# Patient Record
Sex: FEMALE | Race: OTHER | Employment: UNEMPLOYED | URBAN - METROPOLITAN AREA
[De-identification: names, ages, dates, MRNs, and addresses within clinical notes are randomized per-mention and may not be internally consistent; named-entity substitution may affect disease eponyms.]

---

## 2017-12-28 ENCOUNTER — HOSPITAL ENCOUNTER (OUTPATIENT)
Age: 27
Discharge: HOME | End: 2017-12-28
Payer: MEDICARE

## 2017-12-28 LAB
EKG ATRIAL RATE: 66 BPM
EKG P AXIS: 27 DEGREES
EKG P-R INTERVAL: 146 MS
EKG Q-T INTERVAL: 418 MS
EKG QRS DURATION: 74 MS
EKG QTC CALCULATION (BAZETT): 438 MS
EKG R AXIS: 88 DEGREES
EKG T AXIS: 54 DEGREES
EKG VENTRICULAR RATE: 66 BPM

## 2017-12-28 PROCEDURE — 93005 ELECTROCARDIOGRAM TRACING: CPT

## 2017-12-29 ENCOUNTER — HOSPITAL ENCOUNTER (OUTPATIENT)
Age: 27
Discharge: HOME | End: 2017-12-29
Payer: MEDICARE

## 2017-12-29 LAB
ALBUMIN SERPL-MCNC: 3.7 G/DL (ref 3.5–5.2)
ALBUMIN/GLOBULIN RATIO: ABNORMAL (ref 1–2.5)
ALP BLD-CCNC: 54 U/L (ref 35–104)
ALT SERPL-CCNC: 14 U/L (ref 5–33)
ANION GAP SERPL CALCULATED.3IONS-SCNC: 11 MMOL/L (ref 9–17)
AST SERPL-CCNC: 15 U/L
BILIRUB SERPL-MCNC: 0.46 MG/DL (ref 0.3–1.2)
BUN BLDV-MCNC: 6 MG/DL (ref 6–20)
BUN/CREAT BLD: ABNORMAL (ref 9–20)
CALCIUM SERPL-MCNC: 9 MG/DL (ref 8.6–10.4)
CHLORIDE BLD-SCNC: 103 MMOL/L (ref 98–107)
CHOLESTEROL/HDL RATIO: 3
CHOLESTEROL: 122 MG/DL
CO2: 26 MMOL/L (ref 20–31)
CREAT SERPL-MCNC: 0.69 MG/DL (ref 0.5–0.9)
GFR AFRICAN AMERICAN: >60 ML/MIN
GFR NON-AFRICAN AMERICAN: >60 ML/MIN
GFR SERPL CREATININE-BSD FRML MDRD: ABNORMAL ML/MIN/{1.73_M2}
GFR SERPL CREATININE-BSD FRML MDRD: ABNORMAL ML/MIN/{1.73_M2}
GLUCOSE BLD-MCNC: 100 MG/DL (ref 70–99)
HDLC SERPL-MCNC: 41 MG/DL
LDL CHOLESTEROL: 67 MG/DL (ref 0–130)
POTASSIUM SERPL-SCNC: 4 MMOL/L (ref 3.7–5.3)
SODIUM BLD-SCNC: 140 MMOL/L (ref 135–144)
TOTAL PROTEIN: 6.9 G/DL (ref 6.4–8.3)
TRIGL SERPL-MCNC: 70 MG/DL
VLDLC SERPL CALC-MCNC: NORMAL MG/DL (ref 1–30)

## 2017-12-29 PROCEDURE — 80061 LIPID PANEL: CPT

## 2017-12-29 PROCEDURE — 83036 HEMOGLOBIN GLYCOSYLATED A1C: CPT

## 2017-12-29 PROCEDURE — 80053 COMPREHEN METABOLIC PANEL: CPT

## 2017-12-29 PROCEDURE — 36415 COLL VENOUS BLD VENIPUNCTURE: CPT

## 2017-12-31 LAB
ESTIMATED AVERAGE GLUCOSE: 103 MG/DL
HBA1C MFR BLD: 5.2 % (ref 4–6)

## 2022-02-03 ENCOUNTER — HOSPITAL ENCOUNTER (EMERGENCY)
Age: 32
Discharge: HOME | End: 2022-02-03
Attending: SPECIALIST
Payer: COMMERCIAL

## 2022-02-03 ENCOUNTER — APPOINTMENT (OUTPATIENT)
Dept: GENERAL RADIOLOGY | Age: 32
End: 2022-02-03
Payer: COMMERCIAL

## 2022-02-03 VITALS
SYSTOLIC BLOOD PRESSURE: 100 MMHG | BODY MASS INDEX: 21.34 KG/M2 | HEIGHT: 64 IN | TEMPERATURE: 98.4 F | OXYGEN SATURATION: 100 % | RESPIRATION RATE: 18 BRPM | HEART RATE: 82 BPM | DIASTOLIC BLOOD PRESSURE: 64 MMHG | WEIGHT: 125 LBS

## 2022-02-03 DIAGNOSIS — R07.9 CHEST PAIN, UNSPECIFIED TYPE: Primary | ICD-10-CM

## 2022-02-03 DIAGNOSIS — K11.21 ACUTE PAROTITIS: ICD-10-CM

## 2022-02-03 LAB
ABSOLUTE EOS #: 0.1 K/UL (ref 0–0.4)
ABSOLUTE IMMATURE GRANULOCYTE: ABNORMAL K/UL (ref 0–0.3)
ABSOLUTE LYMPH #: 1.8 K/UL (ref 1–4.8)
ABSOLUTE MONO #: 0.7 K/UL (ref 0.1–1.2)
ANION GAP SERPL CALCULATED.3IONS-SCNC: 9 MMOL/L (ref 9–17)
BASOPHILS # BLD: 0 % (ref 0–2)
BASOPHILS ABSOLUTE: 0 K/UL (ref 0–0.2)
BUN BLDV-MCNC: 11 MG/DL (ref 6–20)
BUN/CREAT BLD: ABNORMAL (ref 9–20)
CALCIUM SERPL-MCNC: 9.2 MG/DL (ref 8.6–10.4)
CHLORIDE BLD-SCNC: 104 MMOL/L (ref 98–107)
CO2: 23 MMOL/L (ref 20–31)
CREAT SERPL-MCNC: 0.62 MG/DL (ref 0.5–0.9)
DIFFERENTIAL TYPE: ABNORMAL
EOSINOPHILS RELATIVE PERCENT: 1 % (ref 1–4)
GFR AFRICAN AMERICAN: >60 ML/MIN
GFR NON-AFRICAN AMERICAN: >60 ML/MIN
GFR SERPL CREATININE-BSD FRML MDRD: ABNORMAL ML/MIN/{1.73_M2}
GFR SERPL CREATININE-BSD FRML MDRD: ABNORMAL ML/MIN/{1.73_M2}
GLUCOSE BLD-MCNC: 103 MG/DL (ref 70–99)
HCT VFR BLD CALC: 40.3 % (ref 36–46)
HEMOGLOBIN: 13.4 G/DL (ref 12–16)
IMMATURE GRANULOCYTES: ABNORMAL %
LYMPHOCYTES # BLD: 20 % (ref 24–44)
MCH RBC QN AUTO: 31 PG (ref 26–34)
MCHC RBC AUTO-ENTMCNC: 33.3 G/DL (ref 31–37)
MCV RBC AUTO: 93 FL (ref 80–100)
MONOCYTES # BLD: 8 % (ref 2–11)
NRBC AUTOMATED: ABNORMAL PER 100 WBC
PDW BLD-RTO: 12.7 % (ref 12.5–15.4)
PLATELET # BLD: 258 K/UL (ref 140–450)
PLATELET ESTIMATE: ABNORMAL
PMV BLD AUTO: 8.4 FL (ref 6–12)
POTASSIUM SERPL-SCNC: 3.8 MMOL/L (ref 3.7–5.3)
RBC # BLD: 4.33 M/UL (ref 4–5.2)
RBC # BLD: ABNORMAL 10*6/UL
SEG NEUTROPHILS: 71 % (ref 36–66)
SEGMENTED NEUTROPHILS ABSOLUTE COUNT: 6.4 K/UL (ref 1.8–7.7)
SODIUM BLD-SCNC: 136 MMOL/L (ref 135–144)
TROPONIN INTERP: NORMAL
TROPONIN INTERP: NORMAL
TROPONIN T: NORMAL NG/ML
TROPONIN T: NORMAL NG/ML
TROPONIN, HIGH SENSITIVITY: <6 NG/L (ref 0–14)
TROPONIN, HIGH SENSITIVITY: <6 NG/L (ref 0–14)
WBC # BLD: 9 K/UL (ref 3.5–11)
WBC # BLD: ABNORMAL 10*3/UL

## 2022-02-03 PROCEDURE — 93005 ELECTROCARDIOGRAM TRACING: CPT | Performed by: SPECIALIST

## 2022-02-03 PROCEDURE — 71045 X-RAY EXAM CHEST 1 VIEW: CPT

## 2022-02-03 PROCEDURE — 80048 BASIC METABOLIC PNL TOTAL CA: CPT

## 2022-02-03 PROCEDURE — 36415 COLL VENOUS BLD VENIPUNCTURE: CPT

## 2022-02-03 PROCEDURE — 84484 ASSAY OF TROPONIN QUANT: CPT

## 2022-02-03 PROCEDURE — 99285 EMERGENCY DEPT VISIT HI MDM: CPT

## 2022-02-03 PROCEDURE — 6370000000 HC RX 637 (ALT 250 FOR IP): Performed by: SPECIALIST

## 2022-02-03 PROCEDURE — 85025 COMPLETE CBC W/AUTO DIFF WBC: CPT

## 2022-02-03 RX ORDER — DOXYCYCLINE HYCLATE 100 MG
100 TABLET ORAL ONCE
Status: COMPLETED | OUTPATIENT
Start: 2022-02-03 | End: 2022-02-03

## 2022-02-03 RX ORDER — DOXYCYCLINE HYCLATE 100 MG/1
100 CAPSULE ORAL 2 TIMES DAILY
Qty: 20 CAPSULE | Refills: 0 | Status: SHIPPED | OUTPATIENT
Start: 2022-02-03 | End: 2022-02-13

## 2022-02-03 RX ORDER — ASPIRIN 81 MG/1
324 TABLET, CHEWABLE ORAL ONCE
Status: COMPLETED | OUTPATIENT
Start: 2022-02-03 | End: 2022-02-03

## 2022-02-03 RX ADMIN — DOXYCYCLINE HYCLATE 100 MG: 100 TABLET, COATED ORAL at 04:02

## 2022-02-03 RX ADMIN — ASPIRIN 81 MG 324 MG: 81 TABLET ORAL at 03:12

## 2022-02-03 ASSESSMENT — PAIN DESCRIPTION - LOCATION: LOCATION: FACE

## 2022-02-03 ASSESSMENT — PAIN DESCRIPTION - ORIENTATION: ORIENTATION: LEFT

## 2022-02-03 ASSESSMENT — PAIN SCALES - GENERAL: PAINLEVEL_OUTOF10: 7

## 2022-02-03 ASSESSMENT — PAIN DESCRIPTION - PAIN TYPE: TYPE: ACUTE PAIN

## 2022-02-03 NOTE — ED NOTES
Pt to ER by self, ambulated to room, steady gait. Pt reports pressure to back that started about 24 hours ago, that is now radiating to chest. Pt denies pain, reports \"just pressure. \" Pt denies n/v/SOB. Pt denies cardiac history. Pt also reports a pimple to left side of chin, that she attempted to pop yesterday but could not evacuate any fluid. Pt does report h/o cystic ance. Pt reports area has \"tripled in size\" and is now causing pain, rates 7/10, reports taking  mg at 2100. Pt also reports that COVID symptoms started 1/17, reports she has taken ten COVID tests, all of which were negative, except for the test she took today which \"had a faint positive line. \" Pt reports no COVID vaccines.  Pt very talkative, cannot seems to focus, but remains calm, cooperative, respers even non labored, skin warm pink, no distress, here for eval.      Nohemy Fontana RN  02/03/22 Dave Mota, RN  02/03/22 1367

## 2022-02-03 NOTE — ED NOTES
Pt reports the need to void before triage is started, pt instructed to collect clean catch urine.       Hiram Wilcox RN  02/03/22 5824

## 2022-02-03 NOTE — ED PROVIDER NOTES
[facility] ENCOUNTER      Pt Name: Viola Mistry  MRN: [id number]  Jimmy 1990  Date of evaluation: 2/3/22      CHIEF COMPLAINT       Chief Complaint   Patient presents with    Chest Pain     tightness, started 24 hours ago     Facial Swelling     left side of face, pt reports a pimple that she attmepted to squeeze         HISTORY OF PRESENT ILLNESS    Elsa Jensen is a 27 y.o. female who presents to the emergency department for evaluation of substernal chest pressure and swelling on the left side of the face. Patient started having chest pressure about 36 hours ago and it has been constant pressure 8-9 out of 10 in intensity intermittently getting worse. She had associated shortness of breath but denies any nausea, vomiting, lightheadedness, dizziness, palpitations or diaphoresis. She also denies any swelling in the legs. No history of cough, fever or chills. She started having pain, redness and swelling on the left side of the face yesterday afternoon which has been increased in size tonight and that she comes to the emergency department. She has history of cystic acne and it has gotten infected in the past requiring oral antibiotics. Patient is concerned about infected cystic acne or abscess. She denies any sore throat, shortness of breath, fever or chills. She grades left facial pain is 7 out of 10 in intensity. There are no exacerbating or relieving factors and patient has taken aspirin with transient relief. REVIEW OF SYSTEMS       Review of Systems    All systems reviewed and negative unless noted in HPI. The patient denies fever or constitutional symptoms. Denies vision change. Denies any sore throat or rhinorrhea. Denies any neck pain or stiffness. Presents with chest pressure and shortness of breath. No nausea,  vomiting or diarrhea. Denies any dysuria. Denies urinary frequency or hematuria.     Denies musculoskeletal injury or pain. Denies any weakness, numbness or focal neurologic deficit. Complains of pain, redness and swelling in the left side of the face in the parotid region  No recent psychiatric issues. No easy bruising or bleeding. Denies any polyuria, polydypsia or history of immunocompromise. PAST MEDICAL HISTORY    has no past medical history on file. SURGICAL HISTORY      has no past surgical history on file. CURRENT MEDICATIONS       Previous Medications    No medications on file       ALLERGIES     is allergic to penicillins. FAMILY HISTORY     has no family status information on file. family history is not on file. SOCIAL HISTORY      reports that she has never smoked. She has never used smokeless tobacco. She reports previous alcohol use. She reports that she does not use drugs. PHYSICAL EXAM     INITIAL VITALS:  height is 5' 4\" (1.626 m) and weight is 56.7 kg (125 lb). Her oral temperature is 98.4 °F (36.9 °C). Her blood pressure is 100/64 and her pulse is 82. Her respiration is 18 and oxygen saturation is 100%. Physical Exam  Vitals and nursing note reviewed. Constitutional:       Appearance: She is well-developed. HENT:      Head: Normocephalic and atraumatic. Salivary Glands: Left salivary gland is diffusely enlarged and tender. Comments: Patient has erythema, edema and tenderness in the left parotid gland region. There is no fluctuance or abscess. Nose: Nose normal.      Mouth/Throat:      Dentition: No dental tenderness or dental abscesses. Pharynx: Oropharynx is clear. Eyes:      Extraocular Movements: Extraocular movements intact. Pupils: Pupils are equal, round, and reactive to light. Cardiovascular:      Rate and Rhythm: Normal rate and regular rhythm. Heart sounds: Normal heart sounds. No murmur heard. Pulmonary:      Effort: Pulmonary effort is normal. No respiratory distress. Breath sounds: Normal breath sounds. Abdominal:      General: Bowel sounds are normal. There is no distension. Palpations: Abdomen is soft. Tenderness: There is no abdominal tenderness. Musculoskeletal:      Cervical back: Normal range of motion and neck supple. Skin:     General: Skin is warm and dry. Neurological:      General: No focal deficit present. Mental Status: She is alert and oriented to person, place, and time. DIFFERENTIAL DIAGNOSIS/ MDM:     Bronchitis, Pneumonia, ACS, PE, Musculoskeletal pain, pneumothorax, thoracic aortic dissection, nonspecific chest pain, gastrointestinal in origin  Left facial cellulitis, acute parotitis, parotid gland sialolithiasis. HEART SCORE    Variable       Score   History  []Highly Suspicious      2     []Moderately Suspicious     1     [x]Slightly Suspicious      0     ECG   []Significant ST-depression     2     []Nonspecific Repolarization     1     [x]Normal       0     Age   []>72years old      3    []45-75 years old      1     [x]<39years old      0     Risk Factors  []>3 risk factors or history of atherosclerotic disease 2     [x]1 or 2 risk factors      1     []No risk factors      0     Troponin  []>3x normal limit      2     []1-3x normal limit      1     [x]< normal limit      0   Risk Factors: DM, current or recent (<one month) smoker, HTN, hyperlipidemia, family history of CAD or obesity     Total Score = 1    Score 0-3: 2.5% Major Acute Coronary Event over the next 6 weeks -> D/C home  Score 4-6: 20.3% MACE -> Admit for Observation  Score 7-10: 72.7% MACE ->Early Invasive Strategies  Chest Pain in the Emergency Room: A Multicenter Validation of the [address]. Ian BE, Luis AJ, Eric HUMZA, David TP, [employer], Mast EG, Sim SH, [facility]. Crit Pathw Cardiol. 2010 Sep; 9(3): 164-169.      Risk Stratification for Acute Coronary Syndrome   Family history of coronary artery disease - Yes  History of diabetes - No  History of hypertension - No  History of hyperlipidemia  - No  History of smoking - No  Cocaine use - No  Known coronary artery disease - No  (Patient is classified as low risk if he/she has one or less risk factors excluding diabetes and known coronary artery disease)    Risk Stratification for Thoracic Aortic Dissection (TAD)  Family history of TAD - No  History of connective tissue disorder (i.e. Marfans Syndrome, Agusto-Danlos Syndrome) - No  Pardos Syndrome - No  History of hypertension - No  History of aortic valve disease - No  Pregnancy - No    Risk Stratification for Pulmonary Embolism (PE)  Previous PE - No  Malignancy - No  Obesity - No  Trauma - No  Di filter - No  Pregnancy/postpartum - No  Smoking - No  Prior DVT - No  Immobilization (i.e. leg cast, travel) - No  Surgery within the last 60 days - No  Coagulation disorder - No  Estrogen medicine - No    DIAGNOSTIC RESULTS     EKG: All EKG's are interpreted by the Emergency Department Physician who either signs or Co-signs this chart in the absence of a cardiologist.    EKG Interpretation #1    Interpreted by emergency department physician    Rhythm: normal sinus   Rate: normal  Axis: normal  Conduction: normal  ST Segments: no acute change  T Waves: no acute change  Q Waves: no acute change    Clinical Impression: no acute change, but this is a nonspecific EKG. EKG Interpretation #2    Interpreted by me    Rhythm: normal sinus   Rate: normal  Axis: normal  Ectopy: none  Conduction: normal  ST Segments: no acute change  T Waves: no acute change  Q Waves: none    Clinical Impression: no acute changes and normal EKG    RADIOLOGY:   Interpretation per the Radiologist below, if available at the time of this note:    XR CHEST PORTABLE    Result Date: 2/3/2022  EXAMINATION: ONE XRAY VIEW OF THE CHEST 2/3/2022 3:02 am COMPARISON: None.  HISTORY: ORDERING SYSTEM PROVIDED HISTORY: CHEST PAIN TECHNOLOGIST PROVIDED HISTORY: CHEST PAIN Reason for Exam: chest pain FINDINGS: Heart size is normal.  Positioning is apical lordotic which does somewhat limit assessment. No pneumothorax. No effusion. No distinct focal consolidation. Minimal interstitial prominence. Mild interstitial prominence which is probably incidental.  Early changes of atypical or viral infection less likely.   PA and lateral views would be useful for further assessment, if symptoms persist.       LABS:  Results for orders placed or performed during the hospital encounter of 02/03/22   CBC Auto Differential   Result Value Ref Range    WBC 9.0 3.5 - 11.0 k/uL    RBC 4.33 4.0 - 5.2 m/uL    Hemoglobin 13.4 12.0 - 16.0 g/dL    Hematocrit 40.3 36 - 46 %    MCV 93.0 80 - 100 fL    MCH 31.0 26 - 34 pg    MCHC 33.3 31 - 37 g/dL    RDW 12.7 12.5 - 15.4 %    Platelets 805 841 - 929 k/uL    MPV 8.4 6.0 - 12.0 fL    NRBC Automated NOT REPORTED per 100 WBC    Differential Type NOT REPORTED     Seg Neutrophils 71 (H) 36 - 66 %    Lymphocytes 20 (L) 24 - 44 %    Monocytes 8 2 - 11 %    Eosinophils % 1 1 - 4 %    Basophils 0 0 - 2 %    Immature Granulocytes NOT REPORTED 0 %    Segs Absolute 6.40 1.8 - 7.7 k/uL    Absolute Lymph # 1.80 1.0 - 4.8 k/uL    Absolute Mono # 0.70 0.1 - 1.2 k/uL    Absolute Eos # 0.10 0.0 - 0.4 k/uL    Basophils Absolute 0.00 0.0 - 0.2 k/uL    Absolute Immature Granulocyte NOT REPORTED 0.00 - 0.30 k/uL    WBC Morphology NOT REPORTED     RBC Morphology NOT REPORTED     Platelet Estimate NOT REPORTED    Basic Metabolic Panel w/ Reflex to MG   Result Value Ref Range    Glucose 103 (H) 70 - 99 mg/dL    BUN 11 6 - 20 mg/dL    CREATININE 0.62 0.50 - 0.90 mg/dL    Bun/Cre Ratio NOT REPORTED 9 - 20    Calcium 9.2 8.6 - 10.4 mg/dL    Sodium 136 135 - 144 mmol/L    Potassium 3.8 3.7 - 5.3 mmol/L    Chloride 104 98 - 107 mmol/L    CO2 23 20 - 31 mmol/L    Anion Gap 9 9 - 17 mmol/L    GFR Non-African American >60 >60 mL/min    GFR African American >60 >60 mL/min    GFR Comment          GFR Staging NOT REPORTED    Troponin   Result Value Ref Range    Troponin, High Sensitivity <6 0 - 14 ng/L    Troponin T NOT REPORTED <0.03 ng/mL    Troponin Interp NOT REPORTED    Troponin   Result Value Ref Range    Troponin, High Sensitivity <6 0 - 14 ng/L    Troponin T NOT REPORTED <0.03 ng/mL    Troponin Interp NOT REPORTED        I reviewed all the lab results, 2 sets of cardiac markers are normal    EMERGENCY DEPARTMENT COURSE:   Vitals:    Vitals:    02/03/22 0247 02/03/22 0517   BP: 120/77 100/64   Pulse: 88 82   Resp: 20 18   Temp: 98.4 °F (36.9 °C)    TempSrc: Oral    SpO2: 100% 100%   Weight: 56.7 kg (125 lb)    Height: 5' 4\" (1.626 m)      -------------------------  BP: 100/64, Temp: 98.4 °F (36.9 °C), Pulse: 82, Resp: 18    Orders Placed This Encounter   Medications    aspirin chewable tablet 324 mg    doxycycline hyclate (VIBRA-TABS) tablet 100 mg     Order Specific Question:   Antimicrobial Indications     Answer:   Skin and Soft Tissue Infection    doxycycline hyclate (VIBRAMYCIN) 100 MG capsule     Sig: Take 1 capsule by mouth 2 times daily for 10 days     Dispense:  20 capsule     Refill:  0         During the emergency department course, patient was given aspirin 324 mg orally. She was put on the monitor which reveals normal sinus rhythm. She remained in normal sinus rhythm throughout the ED course. Her chest pain work-up including 2 sets of cardiac markers and EKG twice are unremarkable. Plan is to discharge patient on doxycycline 100 mg twice daily for 10 days. She was given 1 dose in the emergency department prior to discharge. She is advised to follow-up with her PCP for further evaluation as an outpatient including possibly stress test, return if chest pain recurs or if she develops any new symptoms. The patient presents with chest pain that is not suggesting in nature of pulmonary emnbolus, aortic dissection, cardiac ischemia, aortic dissection, or other serious etiology.  Given the extremely low risk of these diagnoses further testing and evaluation for these possibilites are not indicated at is time. The patient has been instructed to return if the symptpoms change or worsen in any way. I have reviewed the disposition diagnosis with the patient and or their family/guardian. I have answered their questions and given discharge instructions. They voiced understanding of these instructions and did not have any further questions or complaints. CONSULTS:  None    PROCEDURES:  None    FINAL IMPRESSION      1. Chest pain, unspecified type    2. Acute parotitis          DISPOSITION/PLAN       PATIENT REFERRED TO:  Vinod Read DO  [address] [phone]    Call in 2 days  For reevaluation of current symptoms    [facility]  [address] [place] [id number] [phone]    If symptoms worsen, If chest pain recurs or any new symptoms appear      DISCHARGE MEDICATIONS:  New Prescriptions    DOXYCYCLINE HYCLATE (VIBRAMYCIN) 100 MG CAPSULE    Take 1 capsule by mouth 2 times daily for 10 days       (Please note that portions of this note were completed with a voice recognition program.  Efforts were made to edit the dictations but occasionally words are mis-transcribed.)    Lynnette Jameson MD,, MD, F.A.C.E.P.   Attending Emergency Medicine Physician      Lynnette Jameson MD  02/03/22 0040

## 2022-02-04 LAB
EKG ATRIAL RATE: 76 BPM
EKG P AXIS: 4 DEGREES
EKG P-R INTERVAL: 148 MS
EKG Q-T INTERVAL: 382 MS
EKG QRS DURATION: 68 MS
EKG QTC CALCULATION (BAZETT): 429 MS
EKG R AXIS: 80 DEGREES
EKG T AXIS: 41 DEGREES
EKG VENTRICULAR RATE: 76 BPM

## 2022-02-05 LAB
EKG ATRIAL RATE: 71 BPM
EKG P AXIS: 76 DEGREES
EKG P-R INTERVAL: 154 MS
EKG Q-T INTERVAL: 402 MS
EKG QRS DURATION: 70 MS
EKG QTC CALCULATION (BAZETT): 436 MS
EKG R AXIS: 80 DEGREES
EKG T AXIS: 64 DEGREES
EKG VENTRICULAR RATE: 71 BPM

## 2022-12-24 ENCOUNTER — APPOINTMENT (OUTPATIENT)
Dept: CT IMAGING | Age: 32
End: 2022-12-24
Payer: COMMERCIAL

## 2022-12-24 ENCOUNTER — HOSPITAL ENCOUNTER (EMERGENCY)
Age: 32
Discharge: HOME | End: 2022-12-24
Attending: EMERGENCY MEDICINE
Payer: COMMERCIAL

## 2022-12-24 VITALS
HEIGHT: 64 IN | TEMPERATURE: 97.7 F | HEART RATE: 95 BPM | OXYGEN SATURATION: 100 % | WEIGHT: 125 LBS | SYSTOLIC BLOOD PRESSURE: 108 MMHG | DIASTOLIC BLOOD PRESSURE: 77 MMHG | RESPIRATION RATE: 16 BRPM | BODY MASS INDEX: 21.34 KG/M2

## 2022-12-24 DIAGNOSIS — R82.71 ASYMPTOMATIC BACTERIURIA DURING PREGNANCY: ICD-10-CM

## 2022-12-24 DIAGNOSIS — R07.9 RIGHT-SIDED CHEST PAIN: Primary | ICD-10-CM

## 2022-12-24 DIAGNOSIS — O99.891 ASYMPTOMATIC BACTERIURIA DURING PREGNANCY: ICD-10-CM

## 2022-12-24 LAB
ABSOLUTE EOS #: 0.1 K/UL (ref 0–0.4)
ABSOLUTE LYMPH #: 1.8 K/UL (ref 1–4.8)
ABSOLUTE MONO #: 0.6 K/UL (ref 0.1–1.2)
ALBUMIN SERPL-MCNC: 3.3 G/DL (ref 3.5–5.2)
ALBUMIN/GLOBULIN RATIO: 1.2 (ref 1–2.5)
ALP BLD-CCNC: 59 U/L (ref 35–104)
ALT SERPL-CCNC: 13 U/L (ref 5–33)
ANION GAP SERPL CALCULATED.3IONS-SCNC: 9 MMOL/L (ref 9–17)
AST SERPL-CCNC: 12 U/L
BACTERIA: ABNORMAL
BASOPHILS # BLD: 1 % (ref 0–2)
BASOPHILS ABSOLUTE: 0.1 K/UL (ref 0–0.2)
BILIRUB SERPL-MCNC: 0.2 MG/DL (ref 0.3–1.2)
BILIRUBIN DIRECT: <0.1 MG/DL
BILIRUBIN URINE: NEGATIVE
BILIRUBIN, INDIRECT: ABNORMAL MG/DL (ref 0–1)
BUN BLDV-MCNC: 13 MG/DL (ref 6–20)
CALCIUM SERPL-MCNC: 8.6 MG/DL (ref 8.6–10.4)
CHLORIDE BLD-SCNC: 104 MMOL/L (ref 98–107)
CO2: 20 MMOL/L (ref 20–31)
COLOR: YELLOW
CREAT SERPL-MCNC: 0.57 MG/DL (ref 0.5–0.9)
D-DIMER QUANTITATIVE: 8 MG/L FEU
EOSINOPHILS RELATIVE PERCENT: 0 % (ref 1–4)
EPITHELIAL CELLS UA: ABNORMAL /HPF (ref 0–5)
GFR SERPL CREATININE-BSD FRML MDRD: >60 ML/MIN/1.73M2
GLUCOSE BLD-MCNC: 81 MG/DL (ref 70–99)
GLUCOSE URINE: NEGATIVE
HCT VFR BLD CALC: 36.2 % (ref 36–46)
HEMOGLOBIN: 12 G/DL (ref 12–16)
KETONES, URINE: NEGATIVE
LEUKOCYTE ESTERASE, URINE: ABNORMAL
LIPASE: 35 U/L (ref 13–60)
LYMPHOCYTES # BLD: 15 % (ref 24–44)
MCH RBC QN AUTO: 31.3 PG (ref 26–34)
MCHC RBC AUTO-ENTMCNC: 33 G/DL (ref 31–37)
MCV RBC AUTO: 94.9 FL (ref 80–100)
MONOCYTES # BLD: 6 % (ref 2–11)
NITRITE, URINE: NEGATIVE
OTHER OBSERVATIONS UA: ABNORMAL
PDW BLD-RTO: 12.5 % (ref 12.5–15.4)
PH UA: 5.5 (ref 5–8)
PLATELET # BLD: 220 K/UL (ref 140–450)
PMV BLD AUTO: 8.6 FL (ref 6–12)
POTASSIUM SERPL-SCNC: 3.9 MMOL/L (ref 3.7–5.3)
PROTEIN UA: NEGATIVE
RBC # BLD: 3.82 M/UL (ref 4–5.2)
RBC UA: ABNORMAL /HPF (ref 0–2)
SEG NEUTROPHILS: 78 % (ref 36–66)
SEGMENTED NEUTROPHILS ABSOLUTE COUNT: 9 K/UL (ref 1.8–7.7)
SODIUM BLD-SCNC: 133 MMOL/L (ref 135–144)
SPECIFIC GRAVITY UA: 1.01 (ref 1–1.03)
TOTAL PROTEIN: 6.1 G/DL (ref 6.4–8.3)
TROPONIN, HIGH SENSITIVITY: <6 NG/L (ref 0–14)
TURBIDITY: CLEAR
URINE HGB: NEGATIVE
UROBILINOGEN, URINE: NORMAL
WBC # BLD: 11.6 K/UL (ref 3.5–11)
WBC UA: ABNORMAL /HPF (ref 0–5)

## 2022-12-24 PROCEDURE — 2580000003 HC RX 258: Performed by: PHYSICIAN ASSISTANT

## 2022-12-24 PROCEDURE — 87086 URINE CULTURE/COLONY COUNT: CPT

## 2022-12-24 PROCEDURE — 99285 EMERGENCY DEPT VISIT HI MDM: CPT

## 2022-12-24 PROCEDURE — 6370000000 HC RX 637 (ALT 250 FOR IP): Performed by: PHYSICIAN ASSISTANT

## 2022-12-24 PROCEDURE — 86403 PARTICLE AGGLUT ANTBDY SCRN: CPT

## 2022-12-24 PROCEDURE — 84484 ASSAY OF TROPONIN QUANT: CPT

## 2022-12-24 PROCEDURE — 81001 URINALYSIS AUTO W/SCOPE: CPT

## 2022-12-24 PROCEDURE — 85379 FIBRIN DEGRADATION QUANT: CPT

## 2022-12-24 PROCEDURE — 85025 COMPLETE CBC W/AUTO DIFF WBC: CPT

## 2022-12-24 PROCEDURE — 93005 ELECTROCARDIOGRAM TRACING: CPT | Performed by: PHYSICIAN ASSISTANT

## 2022-12-24 PROCEDURE — 80076 HEPATIC FUNCTION PANEL: CPT

## 2022-12-24 PROCEDURE — 80048 BASIC METABOLIC PNL TOTAL CA: CPT

## 2022-12-24 PROCEDURE — 6360000004 HC RX CONTRAST MEDICATION: Performed by: PHYSICIAN ASSISTANT

## 2022-12-24 PROCEDURE — 71260 CT THORAX DX C+: CPT | Performed by: PHYSICIAN ASSISTANT

## 2022-12-24 PROCEDURE — 83690 ASSAY OF LIPASE: CPT

## 2022-12-24 PROCEDURE — 36415 COLL VENOUS BLD VENIPUNCTURE: CPT

## 2022-12-24 RX ORDER — SODIUM CHLORIDE 0.9 % (FLUSH) 0.9 %
10 SYRINGE (ML) INJECTION ONCE
Status: COMPLETED | OUTPATIENT
Start: 2022-12-24 | End: 2022-12-24

## 2022-12-24 RX ORDER — CEPHALEXIN 250 MG/1
500 CAPSULE ORAL ONCE
Status: COMPLETED | OUTPATIENT
Start: 2022-12-24 | End: 2022-12-24

## 2022-12-24 RX ORDER — CLOTRIMAZOLE 1 %
1 CREAM WITH APPLICATOR VAGINAL NIGHTLY
Qty: 7 EACH | Refills: 0 | Status: SHIPPED | OUTPATIENT
Start: 2022-12-24 | End: 2022-12-31

## 2022-12-24 RX ORDER — 0.9 % SODIUM CHLORIDE 0.9 %
80 INTRAVENOUS SOLUTION INTRAVENOUS ONCE
Status: DISCONTINUED | OUTPATIENT
Start: 2022-12-24 | End: 2022-12-24 | Stop reason: HOSPADM

## 2022-12-24 RX ORDER — CEPHALEXIN 500 MG/1
500 CAPSULE ORAL 3 TIMES DAILY
Qty: 15 CAPSULE | Refills: 0 | Status: SHIPPED | OUTPATIENT
Start: 2022-12-24 | End: 2022-12-29

## 2022-12-24 RX ADMIN — Medication 80 ML: at 19:59

## 2022-12-24 RX ADMIN — CEPHALEXIN 500 MG: 250 CAPSULE ORAL at 21:15

## 2022-12-24 RX ADMIN — IOPAMIDOL 100 ML: 755 INJECTION, SOLUTION INTRAVENOUS at 19:59

## 2022-12-24 RX ADMIN — SODIUM CHLORIDE, PRESERVATIVE FREE 10 ML: 5 INJECTION INTRAVENOUS at 19:59

## 2022-12-24 ASSESSMENT — PAIN DESCRIPTION - ORIENTATION: ORIENTATION: RIGHT;LOWER

## 2022-12-24 ASSESSMENT — PAIN DESCRIPTION - LOCATION: LOCATION: CHEST

## 2022-12-24 ASSESSMENT — PAIN DESCRIPTION - FREQUENCY: FREQUENCY: CONTINUOUS

## 2022-12-24 ASSESSMENT — PAIN DESCRIPTION - PAIN TYPE: TYPE: ACUTE PAIN

## 2022-12-24 ASSESSMENT — PAIN SCALES - GENERAL
PAINLEVEL_OUTOF10: 10
PAINLEVEL_OUTOF10: 3

## 2022-12-24 ASSESSMENT — PAIN DESCRIPTION - DESCRIPTORS: DESCRIPTORS: DISCOMFORT

## 2022-12-24 NOTE — ED PROVIDER NOTES
Attending Supervisory Note/Shared Visit   I have personally performed a face to face diagnostic evaluation on this patient. I have reviewed the mid-levels findings and agree.       (Please note that portions of this note were completed with a voice recognition program.  Efforts were made to edit the dictations but occasionally words are mis-transcribed.)    Deep Shaw MD  Attending Emergency Physician        Deep Shaw MD  12/24/22 0812

## 2022-12-25 NOTE — DISCHARGE INSTRUCTIONS
answers, a clinician may call you back to offer help and instructions. Tell us how we did during your visit at [email or web address]   and let us know about your experience

## 2022-12-25 NOTE — ED PROVIDER NOTES
[address] Emergency Department  [address] [facility] [address] [facility] [address]  Phone: [phone]  Fax: [phone]        Pt Name: Lanre Nguyen  MRN: [id number]  [place] 1990  Date of evaluation: 12/24/22    [address] [place] [address]       Chief Complaint   Patient presents with    Chest Pain     Had pleurisy 5 months ago. Right side began last night. Feels different       HISTORY OF PRESENT ILLNESS (Location/Symptom, Timing/Onset, Context/Setting, Quality, Duration, Modifying Factors, Severity)      Elsa Jacob is a 27 y.o. female with pertinent PMH of \"pleurisy\" who presents to the ED via private auto with chest pain. Patient reports that she is approximately 16 weeks pregnant; her LMP was in August. Since last night she has been experiencing a sense of pressure and sharp pain to the right side of her chest that is worse with taking a deep breath. Denies taking any medication for symptoms. Denies history of similar occurrences. She does report that her sister has a history of blood clots and has to do Lovenox in pregnancy. Also another family member with history of blood clots. She has never been worked up for this. No history herself. Patient does feel that she is short of breath and that she cannot take a deep breath. She feels like the chest pain is worse when she lays down and a little better when she is sitting upright. Denies noticing any other exacerbating relieving factors. She has not received any prenatal care as she just found out a little over a month ago that she was pregnant and notes that she feels the baby move so just assumes everything is okay. She is on a multivitamin but no prenatals. Mentions that she has been dealing with vaginal yeast but no other vaginal complaints. Denies recent trauma, surgery, or extended travel. Denies hemoptysis, calf pain, or leg swelling.  Denies fever, chills, URI, N/V/D, abdominal pain, vaginal discharge, vaginal bleeding, back pain, dysuria, hematuria, lightheadedness, dizziness, syncope. PAST MEDICAL / SURGICAL / SOCIAL / FAMILY HISTORY     PMH:  has no past medical history on file. Surgical History:  has no past surgical history on file. Social History:  reports that she has never smoked. She has never used smokeless tobacco. She reports that she does not currently use alcohol. She reports that she does not use drugs. Family History: has no family status information on file. family history is not on file. Psychiatric History: None    Allergies: Penicillins    Home Medications:   Prior to Admission medications    Medication Sig Start Date End Date Taking? Authorizing Provider   clotrimazole (LOTRIMIN) 1 % vaginal cream Place 1 applicator vaginally at bedtime for 7 days 12/24/22 12/31/22 Yes Bessy Cristina PA-C       REVIEW OF SYSTEMS  (2-9 systems for level 4, 10 ormore for level 5)      Review of Systems    Constitutional: See HPI. Eyes: Denies vision changes. HENT: Denies sore throat or neck pain. Respiratory: See HPI. Cardiovascular: See HPI. GI: See HPI. : See HPI. Musculoskeletal: Denies recent trauma. Skin: Denies new rashes or wounds. Neurologic:  Denies new numbness or weakness. Psychiatric: Denies sleep disturbances. All other systems negative except as marked. PHYSICAL EXAM  (up to 7 for level 4, 8 or more for level 5)      INITIAL VITALS:  height is 5' 4\" (1.626 m) and weight is 56.7 kg (125 lb). Her oral temperature is 97.7 °F (36.5 °C). Her blood pressure is 108/77 and her pulse is 95. Her respiration is 16 and oxygen saturation is 100%. Vital signs reviewed. Physical Exam    General:  Alert, cooperative, well-groomed, well-nourished, appears stated age, and is in no acute distress. Head:  Normocephalic, atraumatic, and without obvious abnormality. Eyes:  Sclerae/conjunctivae clear without injection, pallor, or icterus. Corneas clear without opacities. EOM's intact. ENT: Ears and nose are all without obvious masses lesion or deformity. No oropharynx examination performed due to aerosolization risk during COVID-19 pandemic. Neck: Supple and symmetrical. Trachea midline. No adenopathy. No jugular venous distention. Lungs:   No respiratory distress. CTA bilaterally. No wheezes, rhonchi, or rales. No chest wall tenderness to palpation. Heart:  Regular rate. Regular rhythm. No murmurs, rubs, or gallops. Abdomen:   Normoactive bowel sounds. Gravid abdomen. Uterus above umbulicus. Soft, nontender, nondistended without guarding or rebound. No palpable masses. No CVA tenderness. Extremities: Warm and dry without erythema or edema. No venous stasis changes. Distal pulses 2+. Skin: Soft, good turgor, and well-hydrated. No obvious rashes or lesions. Neurologic: GCS is 15 and no focal deficits are appreciated. Normal gait. Grossly normal motor and sensation. Speech clear. Psychiatric: Normal mood and affect. Normal behavior. Coherent thought process. DIFFERENTIAL DIAGNOSIS / MDM     DDx: ACS/NSTEMI/STEMI/angina, arrhythmia, trauma, aortic dissection, PE, pneumonia, pneumothorax, esophageal rupture, tamponade, Cocaine use, pericarditis, GERD, costochondritis, endocarditis, anxiety    Patient is a 27 y.o. female who presents to the ED today with symptoms of chest pain as described above. Vital signs are stable. Physical exam demonstrates a well-appearing nontoxic female in no acute distress. She is mildly anxious. Lungs are clear to auscultation. Heart rate and rhythm are regular. Abdomen is soft and nontender. Gravid abdomen noted. Uterus is palpable just above the umbilicus. As the patient has never had an ultrasound and thinks she is about \"16 weeks\" I performed a bedside ultrasound which shows good fetal movement cardiac motion with fetal heart rate at 142. Patient was saying that her LMP was in August but even so that would still make her 20 weeks.   She has absolutely no tenderness palpation of the abdomen and we do not have ultrasound available here at this time. I do not feel that she needs a formal ultrasound from an ER setting and can obtain one outpatient. In regards to the chest pain, with the patient's family history of blood clots, particular during pregnancy, feel that she requires a work-up to rule out PE. Ideally her D-dimer will be negative but if not we will obtain a CT scan of the chest as benefits outweigh the risks at this time. HEART Risk Score:      Criteria Score Patient #   History High Suspicion 2 0    Mod Suspicion 1     Slightly Suspicion 0    EKG Significant ST Dep. 2 0    Nonspecific 1     Normal 0    Age ? 69 y/o 2 27 y.o.  0    > 40 y/o & < 71 y/o 1     ? 40 y/o 0    Risk Factors* ? 3 2 0    1-2 1     0 0    Troponin ? 3x NL 2 0    > 1 & < 3x NL 1     Normal 0    Score 0-3 Low 0    4-6 Mod     ?7 High    * Risk Factors include: Diabetes, Tobacco use, Hypertension, Hyperlipidemia, Obesity, Atherosclerotic Disease (Prior MI, PCI/CABG, CVA/TIA, PAD), Family History of CAD (before age 72)     PERC Criteria   Criteria Response   Age ? 50 No   Heart Rate ? 100 No   Pulse Oximetry ? 95 No   Previous history of DVT/PE No (fam yes)   Trauma/surgery ? 4 weeks No   Hemoptysis No   Exogenous estrogen use Yes   Unilateral leg swelling No   * Pulmonary embolism may be ruled out with a pretest probability of <15% and completely negative PERC Criteria. The likelihood of pulmonary embolism in this population is 1.8%.     PLAN (LABS / IMAGING / EKG):  Orders Placed This Encounter   Procedures    Culture, Urine    CT CHEST PULMONARY EMBOLISM W CONTRAST    CBC with Auto Differential    Basic Metabolic Panel w/ Reflex to MG    Hepatic Function Panel    Lipase    Troponin    D-Dimer, Quantitative    Urinalysis with Reflex to Culture    Microscopic Urinalysis    EKG 12 Lead    Insert peripheral IV       MEDICATIONS ORDERED:  Orders Placed This Encounter Medications    DISCONTD: 0.9 % sodium chloride bolus    sodium chloride flush 0.9 % injection 10 mL    iopamidol (ISOVUE-370) 76 % injection 100 mL    cephALEXin (KEFLEX) 500 MG capsule     Sig: Take 1 capsule by mouth 3 times daily for 5 days     Dispense:  15 capsule     Refill:  0    cephALEXin (KEFLEX) capsule 500 mg     Order Specific Question:   Antimicrobial Indications     Answer:   Urinary Tract Infection    clotrimazole (LOTRIMIN) 1 % vaginal cream     Sig: Place 1 applicator vaginally at bedtime for 7 days     Dispense:  7 each     Refill:  0       Controlled Substances Monitoring:     DIAGNOSTIC RESULTS     EKG: All EKG's are interpreted by the Emergency Department Physician who either signs or Co-signs this chart in the [address] a cardiologist.    See ED attending note    RADIOLOGY: All images are read by the radiologist and their interpretations are reviewed. CT CHEST PULMONARY EMBOLISM W CONTRAST    Result Date: 12/24/2022  EXAMINATION: CTA OF THE CHEST 12/24/2022 7:57 pm TECHNIQUE: CTA of the chest was performed after the administration of intravenous contrast.  Multiplanar reformatted images are provided for review. MIP images are provided for review. Automated exposure control, iterative reconstruction, and/or weight based adjustment of the mA/kV was utilized to reduce the radiation dose to as low as reasonably achievable. COMPARISON: None. HISTORY: ORDERING SYSTEM PROVIDED HISTORY: RUQ/R chest pain, pregnant, recent travel, SOB TECHNOLOGIST PROVIDED HISTORY: RUQ/R chest pain, pregnant, recent travel, SOB Decision Support Exception - unselect if not a suspected or confirmed emergency medical condition->Emergency Medical Condition (MA) Reason for Exam: RUQ/R chest pain, pregnant, recent travel, SOB FINDINGS: Pulmonary Arteries: Pulmonary arteries are adequately opacified for evaluation. No evidence of intraluminal filling defect to suggest pulmonary embolism.   Main pulmonary artery is normal Result Value Ref Range    Glucose 81 70 - 99 mg/dL    BUN 13 6 - 20 mg/dL    Creatinine 0.57 0.50 - 0.90 mg/dL    Est, Glom Filt Rate >60 >60 mL/min/1.73m2    Calcium 8.6 8.6 - 10.4 mg/dL    Sodium 133 (L) 135 - 144 mmol/L    Potassium 3.9 3.7 - 5.3 mmol/L    Chloride 104 98 - 107 mmol/L    CO2 20 20 - 31 mmol/L    Anion Gap 9 9 - 17 mmol/L   Hepatic Function Panel   Result Value Ref Range    Albumin 3.3 (L) 3.5 - 5.2 g/dL    Alkaline Phosphatase 59 35 - 104 U/L    ALT 13 5 - 33 U/L    AST 12 <32 U/L    Total Bilirubin 0.2 (L) 0.3 - 1.2 mg/dL    Bilirubin, Direct <0.1 <0.3 mg/dL    Bilirubin, Indirect Can not be calculated 0.0 - 1.0 mg/dL    Total Protein 6.1 (L) 6.4 - 8.3 g/dL    Albumin/Globulin Ratio 1.2 1.0 - 2.5   Lipase   Result Value Ref Range    Lipase 35 13 - 60 U/L   Troponin   Result Value Ref Range    Troponin, High Sensitivity <6 0 - 14 ng/L   D-Dimer, Quantitative   Result Value Ref Range    D-Dimer, Quant 8.00 mg/L FEU   Urinalysis with Reflex to Culture    Specimen: Urine   Result Value Ref Range    Color, UA Yellow Yellow    Turbidity UA Clear Clear    Glucose, Ur NEGATIVE NEGATIVE    Bilirubin Urine NEGATIVE NEGATIVE    Ketones, Urine NEGATIVE NEGATIVE    Specific Gravity, UA 1.007 1.005 - 1.030    Urine Hgb NEGATIVE NEGATIVE    pH, UA 5.5 5.0 - 8.0    Protein, UA NEGATIVE NEGATIVE    Urobilinogen, Urine Normal Normal    Nitrite, Urine NEGATIVE NEGATIVE    Leukocyte Esterase, Urine MODERATE (A) NEGATIVE   Microscopic Urinalysis   Result Value Ref Range    WBC, UA 10 TO 20 0 - 5 /HPF    RBC, UA 2 TO 5 0 - 2 /HPF    Epithelial Cells UA 2 TO 5 0 - 5 /HPF    Bacteria, UA MODERATE (A) None    Other Observations UA Culture ordered based on defined criteria. (A) NOT REQ.    EKG 12 Lead   Result Value Ref Range    Ventricular Rate 81 BPM    Atrial Rate 81 BPM    P-R Interval 170 ms    QRS Duration 72 ms    Q-T Interval 370 ms    QTc Calculation (Bazett) 429 ms    P Axis -12 degrees    R Axis 78 degrees T Axis 38 degrees       EMERGENCY DEPARTMENT COURSE           Vitals:    Vitals:    12/24/22 1759   BP: 108/77   Pulse: 95   Resp: 16   Temp: 97.7 °F (36.5 °C)   TempSrc: Oral   SpO2: 100%   Weight: 56.7 kg (125 lb)   Height: 5' 4\" (1.626 m)     -------------------------  BP: 108/77, Temp: 97.7 °F (36.5 °C), Heart Rate: 95, Resp: 16      RE-EVALUATION:  The patient's symptomatology and physical exam are not completely consistent with esophageal rupture, myocarditis, ACS, PE, pneumothorax, cardiac tomponade, or aortic pathology. The patient's EKG demonstrates normal sinus without acute ischemia/infarction noted. Unfortunately she is pregnant and was PERC positive and her ddimer was 8 and weighing the risks and benefits, along with radiology advice, we elected to perform a CT scan of her chest to rule-out PE. The patient's chest CT was within normal limits making significant pneumothorax, pneumonia, lung abscess, pleurisy, or aortic pathology much less likely. Patient's lab work including troponin was unremarkable. Patient has been updated regarding these results and reports that the chest pain has improved. We discussed that this could GERD vs anxiety vs costochondritis or pleurisy. Asymptomatic bacturia noted as well and will treat. Patient advised that she needs to see an OB for a formal u/s and prenatal care and was given information for one. The patient and/or family and I have discussed the diagnosis and risks, and we agree with discharging home to follow-up with their primary doctor. The patient appears stable for discharge and has been instructed to return immediately for new concerning symptoms, if the symptoms worsen in any way.  We have discussed the symptoms which are most concerning (e.g., fever, changing or worsening pain, persistent vomiting, bloody stools, chest pain, shortness of breath, numbness or weakness to the arms or legs, coolness or color change to the arms or legs) that necessitate immediate return. The patient understands that at this time there is no evidence for a more malignant underlying process, but the patient also understands that early in the process of an illness or injury, an emergency department workup can be falsely reassuring. Routine discharge counseling was given, and the patient understands that worsening, changing or persistent symptoms should prompt an immediate call or follow up with their primary physician or return to the emergency department. The importance of appropriate follow up was also discussed. I have reviewed the disposition diagnosis with the patient and or their family/guardian. I have answered their questions and given discharge instructions. They voiced understanding of these instructions and did not have any further questions or complaints. This patient was seen by the attending physician and they agreed with the assessment and plan. CONSULTS:  None    PROCEDURES:  None    FINAL IMPRESSION      1. Right-sided chest pain    2. Asymptomatic bacteriuria during pregnancy          DISPOSITION / PLAN     CONDITION ON DISPOSITION:   Good / Stable for discharge.      PATIENT REFERRED TO:  Malik Paul DO  [address] [place] [address] O 21 [phone]    Call in 3 days      [address] [address] [phone]  Call in 3 days  To schedule an appointment as soon as possible    DISCHARGE MEDICATIONS:  Discharge Medication List as of 12/24/2022  9:10 PM        START taking these medications    Details   cephALEXin (KEFLEX) 500 MG capsule Take 1 capsule by mouth 3 times daily for 5 days, Disp-15 capsule, R-0Normal      clotrimazole (LOTRIMIN) 1 % vaginal cream Place 1 applicator vaginally at bedtime for 7 days, Disp-7 each, R-0Normal             Guillermo Rosen PA-C   Emergency Medicine Physician Assistant    (Please note that portions of this note were completed with a voice recognition program.  Efforts were made to edit the dictations but occasionally words aremis-transcribed.)      Danielle Ortega PA-C  12/30/22 2779

## 2022-12-26 LAB
CULTURE: ABNORMAL
EKG ATRIAL RATE: 81 BPM
EKG P AXIS: -12 DEGREES
EKG P-R INTERVAL: 170 MS
EKG Q-T INTERVAL: 370 MS
EKG QRS DURATION: 72 MS
EKG QTC CALCULATION (BAZETT): 429 MS
EKG R AXIS: 78 DEGREES
EKG T AXIS: 38 DEGREES
EKG VENTRICULAR RATE: 81 BPM
SPECIMEN DESCRIPTION: ABNORMAL

## 2023-03-17 ENCOUNTER — HOSPITAL ENCOUNTER (OUTPATIENT)
Age: 33
Discharge: HOME | End: 2023-03-18
Attending: OBSTETRICS & GYNECOLOGY | Admitting: OBSTETRICS & GYNECOLOGY
Payer: COMMERCIAL

## 2023-03-17 DIAGNOSIS — R10.11 RUQ PAIN: Primary | ICD-10-CM

## 2023-03-17 PROBLEM — Z3A.37 37 WEEKS GESTATION OF PREGNANCY: Status: ACTIVE | Noted: 2023-03-17

## 2023-03-17 RX ORDER — CYCLOBENZAPRINE HCL 10 MG
10 TABLET ORAL
Status: DISCONTINUED | OUTPATIENT
Start: 2023-03-17 | End: 2023-03-18

## 2023-03-17 RX ORDER — ONDANSETRON 2 MG/ML
4 INJECTION INTRAMUSCULAR; INTRAVENOUS EVERY 6 HOURS PRN
Status: DISCONTINUED | OUTPATIENT
Start: 2023-03-17 | End: 2023-03-18 | Stop reason: HOSPADM

## 2023-03-17 RX ORDER — ACETAMINOPHEN 500 MG
1000 TABLET ORAL EVERY 6 HOURS PRN
Status: DISCONTINUED | OUTPATIENT
Start: 2023-03-17 | End: 2023-03-18 | Stop reason: HOSPADM

## 2023-03-17 RX ORDER — ONDANSETRON 4 MG/1
4 TABLET, ORALLY DISINTEGRATING ORAL EVERY 8 HOURS PRN
Status: DISCONTINUED | OUTPATIENT
Start: 2023-03-17 | End: 2023-03-18 | Stop reason: HOSPADM

## 2023-03-17 RX ORDER — LIDOCAINE 4 G/G
1 PATCH TOPICAL
Status: DISCONTINUED | OUTPATIENT
Start: 2023-03-17 | End: 2023-03-18 | Stop reason: HOSPADM

## 2023-03-18 VITALS
HEIGHT: 64 IN | BODY MASS INDEX: 24.92 KG/M2 | TEMPERATURE: 98.4 F | WEIGHT: 146 LBS | HEART RATE: 73 BPM | RESPIRATION RATE: 16 BRPM | SYSTOLIC BLOOD PRESSURE: 104 MMHG | DIASTOLIC BLOOD PRESSURE: 67 MMHG | OXYGEN SATURATION: 99 %

## 2023-03-18 PROBLEM — R10.11 RUQ PAIN: Status: ACTIVE | Noted: 2023-03-18

## 2023-03-18 PROBLEM — O09.30 INSUFFICIENT PRENATAL CARE: Status: ACTIVE | Noted: 2023-03-18

## 2023-03-18 PROBLEM — A60.00 GENITAL HERPES: Status: ACTIVE | Noted: 2023-03-18

## 2023-03-18 LAB
ABSOLUTE EOS #: 0.05 K/UL (ref 0–0.44)
ABSOLUTE IMMATURE GRANULOCYTE: 0.09 K/UL (ref 0–0.3)
ABSOLUTE LYMPH #: 1.92 K/UL (ref 1.1–3.7)
ABSOLUTE MONO #: 0.63 K/UL (ref 0.1–1.2)
ALBUMIN SERPL-MCNC: 3.6 G/DL (ref 3.5–5.2)
ALBUMIN/GLOBULIN RATIO: 1 (ref 1–2.5)
ALP SERPL-CCNC: 141 U/L (ref 35–104)
ALT SERPL-CCNC: 11 U/L (ref 5–33)
ANION GAP SERPL CALCULATED.3IONS-SCNC: 13 MMOL/L (ref 9–17)
AST SERPL-CCNC: 17 U/L
BASOPHILS # BLD: 0 % (ref 0–2)
BASOPHILS ABSOLUTE: 0.03 K/UL (ref 0–0.2)
BILIRUB SERPL-MCNC: 0.4 MG/DL (ref 0.3–1.2)
BUN SERPL-MCNC: 9 MG/DL (ref 6–20)
CALCIUM SERPL-MCNC: 9 MG/DL (ref 8.6–10.4)
CHLORIDE SERPL-SCNC: 103 MMOL/L (ref 98–107)
CO2 SERPL-SCNC: 21 MMOL/L (ref 20–31)
CREAT SERPL-MCNC: 0.64 MG/DL (ref 0.5–0.9)
EOSINOPHILS RELATIVE PERCENT: 1 % (ref 1–4)
GFR SERPL CREATININE-BSD FRML MDRD: >60 ML/MIN/1.73M2
GLUCOSE SERPL-MCNC: 94 MG/DL (ref 70–99)
HCT VFR BLD AUTO: 39.6 % (ref 36.3–47.1)
HGB BLD-MCNC: 13.2 G/DL (ref 11.9–15.1)
IMMATURE GRANULOCYTES: 1 %
LYMPHOCYTES # BLD: 21 % (ref 24–43)
MCH RBC QN AUTO: 31.1 PG (ref 25.2–33.5)
MCHC RBC AUTO-ENTMCNC: 33.3 G/DL (ref 28.4–34.8)
MCV RBC AUTO: 93.4 FL (ref 82.6–102.9)
MICROORGANISM SPEC CULT: NORMAL
MONOCYTES # BLD: 7 % (ref 3–12)
NRBC AUTOMATED: 0 PER 100 WBC
PDW BLD-RTO: 12.6 % (ref 11.8–14.4)
PLATELET # BLD AUTO: 209 K/UL (ref 138–453)
PMV BLD AUTO: 11 FL (ref 8.1–13.5)
POTASSIUM SERPL-SCNC: 3.4 MMOL/L (ref 3.7–5.3)
PROT SERPL-MCNC: 7.1 G/DL (ref 6.4–8.3)
RBC # BLD: 4.24 M/UL (ref 3.95–5.11)
SEG NEUTROPHILS: 70 % (ref 36–65)
SEGMENTED NEUTROPHILS ABSOLUTE COUNT: 6.24 K/UL (ref 1.5–8.1)
SODIUM SERPL-SCNC: 137 MMOL/L (ref 135–144)
SPECIMEN DESCRIPTION: NORMAL
WBC # BLD AUTO: 9 K/UL (ref 3.5–11.3)

## 2023-03-18 PROCEDURE — 2500000003 HC RX 250 WO HCPCS

## 2023-03-18 PROCEDURE — 87086 URINE CULTURE/COLONY COUNT: CPT

## 2023-03-18 PROCEDURE — 87491 CHLMYD TRACH DNA AMP PROBE: CPT

## 2023-03-18 PROCEDURE — 80053 COMPREHEN METABOLIC PANEL: CPT

## 2023-03-18 PROCEDURE — 85025 COMPLETE CBC W/AUTO DIFF WBC: CPT

## 2023-03-18 PROCEDURE — 6370000000 HC RX 637 (ALT 250 FOR IP): Performed by: STUDENT IN AN ORGANIZED HEALTH CARE EDUCATION/TRAINING PROGRAM

## 2023-03-18 PROCEDURE — 87591 N.GONORRHOEAE DNA AMP PROB: CPT

## 2023-03-18 PROCEDURE — 6370000000 HC RX 637 (ALT 250 FOR IP)

## 2023-03-18 PROCEDURE — 2580000003 HC RX 258

## 2023-03-18 RX ORDER — ACETAMINOPHEN 500 MG
1000 TABLET ORAL EVERY 8 HOURS
Qty: 180 TABLET | Refills: 1 | Status: SHIPPED | OUTPATIENT
Start: 2023-03-18 | End: 2023-04-17

## 2023-03-18 RX ORDER — MAGNESIUM HYDROXIDE/ALUMINUM HYDROXICE/SIMETHICONE 120; 1200; 1200 MG/30ML; MG/30ML; MG/30ML
30 SUSPENSION ORAL ONCE
Status: COMPLETED | OUTPATIENT
Start: 2023-03-18 | End: 2023-03-18

## 2023-03-18 RX ORDER — CYCLOBENZAPRINE HCL 5 MG
5 TABLET ORAL 2 TIMES DAILY PRN
Qty: 20 TABLET | Refills: 0 | Status: SHIPPED | OUTPATIENT
Start: 2023-03-18 | End: 2023-03-28

## 2023-03-18 RX ORDER — LIDOCAINE 50 MG/G
1 PATCH TOPICAL DAILY
Qty: 10 PATCH | Refills: 0 | Status: SHIPPED | OUTPATIENT
Start: 2023-03-18 | End: 2023-03-28

## 2023-03-18 RX ADMIN — ALUMINUM HYDROXIDE, MAGNESIUM HYDROXIDE, AND SIMETHICONE 30 ML: 200; 200; 20 SUSPENSION ORAL at 01:27

## 2023-03-18 RX ADMIN — SODIUM CHLORIDE, PRESERVATIVE FREE 20 MG: 5 INJECTION INTRAVENOUS at 01:47

## 2023-03-18 RX ADMIN — FLUCONAZOLE 150 MG: 100 TABLET ORAL at 02:34

## 2023-03-18 RX ADMIN — ACETAMINOPHEN 1000 MG: 500 TABLET ORAL at 01:29

## 2023-03-18 ASSESSMENT — PAIN SCALES - GENERAL: PAINLEVEL_OUTOF10: 7

## 2023-03-18 NOTE — FLOWSHEET NOTE
Pt. Discharged home. Discharge paperwork reviewed with pt. And verbalized understanding. Pt. Ambulated out of L&D.

## 2023-03-18 NOTE — FLOWSHEET NOTE
Pt. Presented to L&D with Rib cage pain located in the RUQ. Pt. Has Negative CTX. Pt. Has negative Bloody show. Pt. Has negative SROM. Pt. Has Positive FM.

## 2023-03-18 NOTE — H&P
OBSTETRICAL HISTORY [facility]    Date: 3/17/2023       Time: 11:33 PM   Patient Name: Sumeet Marin     Patient : 1990  Room/Bed: [place] [address]    Admission Date/Time: 3/17/2023 11:24 PM    CC: Rib pain     HPI: Elsa Marin is a 26 y.o.  at Unknown who presents to L&D with complaints of rib pain. She states that she had the pain yesterday and it resolved. Pain returned an hour ago and she wanted to be evaluated. She was previously seen at [facility] yesterday with normal trejo for this pain. She has no other complaints at this timePatient denies any fever, chills, N/V, headaches, vision changes, chest pain, shortness of breath, RUQ pain, and increased swelling/tenderness in bilateral lower extremities. Patient denies any vaginal discharge and any urinary complaints. The patient reports fetal movement is present, denies contractions, denies loss of fluid, denies vaginal bleeding. DATING:  LMP: No LMP recorded. Patient is pregnant. Estimated Date of Delivery: None noted. Based on: third trimester ultrasound, at 37 2/7 weeks GA    PREGNANCY RISK FACTORS:  There is no problem list on file for this patient.      Steroids Given In This Pregnancy:  no     REVIEW OF SYSTEMS:  Constitutional: negative fever, negative chills  HEENT: negative visual disturbances, negative headaches  Respiratory: negative dyspnea, negative cough  Cardiovascular: negative chest pain,  negative palpitations  Gastrointestinal: positive abdominal pain, negative RUQ pain, negative N/V, negative diarrhea, negative constipation  Genitourinary: negative dysuria, negative vaginal discharge, negative vaginal bleeding  Dermatological: negative rash  Hematologic: negative bruising  Immunologic/Lymphatic: negative recent illness, negative recent sick contact  Musculoskeletal: negative back pain, negative myalgias, negative arthralgias  Neurological:  negative dizziness, negative weakness  Behavior/Psych: negative depression, negative anxiety    OBSTETRIC HISTORY:   OB History    Para Term  AB Living   1 0 0 0 0 0   SAB IAB Ectopic Molar Multiple Live Births   0 0 0 0 0 0      # Outcome Date GA Lbr Brien/2nd Weight Sex Delivery Anes PTL Lv   1 Current                PAST MEDICAL HISTORY:   has no past medical history on file. PAST SURGICAL HISTORY:   has no past surgical history on file. ALLERGIES:  is allergic to penicillins. MEDICATIONS:  Prior to Admission medications    Not on File       FAMILY HISTORY:  family history is not on file. SOCIAL HISTORY:   reports that she has never smoked. She has never used smokeless tobacco. She reports that she does not currently use alcohol. She reports that she does not use drugs.     VITALS:  Vitals:    23 2359   BP: 104/67   Pulse: 73   Resp: 16   Temp: 98.4 °F (36.9 °C)   TempSrc: Oral   SpO2: 99%   Weight: 146 lb (66.2 kg)   Height: 5' 4\" (1.626 m)     PHYSICAL EXAM:  Fetal Heart Monitor:  Baseline Heart Rate 125, moderate variability, present accelerations, absent decelerations  Hyder: rare contractions    General appearance:  no apparent distress, alert, and cooperative  HEENT: head atraumatic, normocephalic, moist mucous membranes, trachea midline  Neurologic:  alert, oriented, normal speech, no focal findings or movement disorder noted  Lungs:  No increased work of breathing, good air exchange, clear to auscultation bilaterally, no crackles or wheezing  Heart:  regular rate and rhythm and no murmur    Abdomen:  soft, gravid, and braswell sign negative, minimal tenderness with palpation of lower ribs, no bruising or rashes noted, no abnormal step off on ribs  Extremities:  no calf tenderness, non edematous   Musculoskeletal: Gross strength equal and intact throughout, no gross abnormalities, range of motion normal in hips, knees, shoulders and spine  Psychiatric: Mood appropriate, normal affect   Rectal Exam: not indicated  Pelvic Exam: not indicated    PRENATAL LAB RESULTS:  Blood Type/Rh: O pos  Antibody Screen: negative  Hemoglobin, Hematocrit, Platelets: 52/92.8/116  Rubella: immune  T. Pallidum, IgG: non-reactive  Hepatitis B Surface Antigen: non-reactive   Hepatitis C Antibody: non-reactive   HIV: non-reactive   Gonorrhea: not done  Chlamydia: not done  Urine culture: positive, GBS <10,000  date: 22, positive GBS 10,000-50,000 CFU 1/10/23    Early 1 hour Glucose Tolerance Test: not done  1 hour Glucose Tolerance Test: not done    Group B Strep: bacteruria  Cystic Fibrosis Screen: not available  Sickle Cell Screen: not available  First Trimester Screen: not available  QUAD screen: not available  msAFP: not available  Non-Invasive Prenatal Testing: not available  Anatomy US: posterior placenta away from the os, 3VC, normal anatomy    ASSESSMENT & PLAN:  Elsa Burciaga is a 26 y.o. female  at 37w3d IUP   - GBS bacteruria / Rh positive / R immune   - No indication for GBS prophylaxis unless delivery is imminent     Rib pain  - Patient complaining of rib pain since an hour ago. She was seen for her first OB appointment today and stated that she previously had symptoms but they resolved. - CBC, CMP, Amylase and Lipase was wnl today at [address]. Repeat CBC and CMP unremarkable on admission   - cEFM/TOCO - Cat 1 tracing, rare contractions   - UA from 3/16/23 showed large leukocyte esterase, negative nitrites and normal WBC. Low suspicion for UTI   - UCx will be collected given hx GBS bacteruria x2 without a negative Ucx, result pending   - GC/C not previously obtained in this pregnancy, collected as a urine specimen, results pending   - Vaginitis positive for yeast on 3/16/23. She was treated during her admission   - Will try symptomatic medications with Tylenol, Flexeril and Lidocaine patch   - GI cocktail ordered   - Patient reassess and pain improved from admission.  Pain that the patient is experiencing is most likely of musculoskeletal origin. Encourged PO hydration, pregnancy support band, Tylenol PRN,  heat PRN for discomfort. Rx for Tylenol, Flexeril and Lidocaine patches sent to preferred pharmacy. Patient requesting outpatient gallbladder ultrasound despite reassurance that this pain is suspected to be MSK in origin. She is wanting some peace of mind. As a result, RUQ US ordered per patient request. Patient will be discharged home. Patient counseled on labor precautions, pre-eclampsia signs and symptoms, PO hydration, and fetal kick counts. All questions and concerns were addressed and patient expressed understanding. Patient advised to follow up in the office for next appointment on 3/22/23. Hx genital herpes    - Currently on suppression therapy with Valtrex        Insufficient PNC   - Patient has had only one prenatal appointment in this pregnancy   - Dating [facility] with Donato Toro, encouraged compliance       BMI 21    There are no problems to display for this patient. Plan discussed with Dr. Ernestine Mack, who is agreeable. Steroids given this admission: No    Risks, benefits, alternatives and possible complications have been discussed in detail with the patient. Admission, and post admission procedures and expectations were discussed in detail. All questions were answered.     Attending's Name: Dr. Kelly Christopher MD  Ob/Gyn Resident  3/17/2023, 11:33 PM

## 2023-03-20 LAB
CHLAMYDIA DNA UR QL NAA+PROBE: NEGATIVE
N GONORRHOEA DNA UR QL NAA+PROBE: NEGATIVE
SPECIMEN DESCRIPTION: NORMAL

## 2023-04-06 ENCOUNTER — ANESTHESIA (OUTPATIENT)
Dept: LABOR AND DELIVERY | Age: 33
End: 2023-04-06
Payer: COMMERCIAL

## 2023-04-06 ENCOUNTER — ANESTHESIA EVENT (OUTPATIENT)
Dept: LABOR AND DELIVERY | Age: 33
End: 2023-04-06
Payer: COMMERCIAL

## 2023-04-06 ENCOUNTER — HOSPITAL ENCOUNTER (INPATIENT)
Age: 33
LOS: 2 days | Discharge: HOME | DRG: 542 | End: 2023-04-08
Attending: OBSTETRICS & GYNECOLOGY | Admitting: OBSTETRICS & GYNECOLOGY
Payer: COMMERCIAL

## 2023-04-06 PROBLEM — Z3A.40 40 WEEKS GESTATION OF PREGNANCY: Status: ACTIVE | Noted: 2023-04-06

## 2023-04-06 LAB
ABO/RH: NORMAL
ABSOLUTE EOS #: <0.03 K/UL (ref 0–0.44)
ABSOLUTE IMMATURE GRANULOCYTE: 0.1 K/UL (ref 0–0.3)
ABSOLUTE LYMPH #: 1.44 K/UL (ref 1.1–3.7)
ABSOLUTE MONO #: 0.85 K/UL (ref 0.1–1.2)
AMPHETAMINE SCREEN URINE: NEGATIVE
ANTIBODY SCREEN: NEGATIVE
ARM BAND NUMBER: NORMAL
BARBITURATE SCREEN URINE: NEGATIVE
BASOPHILS # BLD: 0 % (ref 0–2)
BASOPHILS ABSOLUTE: 0.04 K/UL (ref 0–0.2)
BENZODIAZEPINE SCREEN, URINE: NEGATIVE
CANNABINOID SCREEN URINE: NEGATIVE
COCAINE METABOLITE, URINE: NEGATIVE
EOSINOPHILS RELATIVE PERCENT: 0 % (ref 1–4)
EXPIRATION DATE: NORMAL
FENTANYL URINE: NEGATIVE
HCT VFR BLD AUTO: 37.7 % (ref 36.3–47.1)
HGB BLD-MCNC: 12.4 G/DL (ref 11.9–15.1)
IMMATURE GRANULOCYTES: 1 %
LYMPHOCYTES # BLD: 10 % (ref 24–43)
MCH RBC QN AUTO: 31 PG (ref 25.2–33.5)
MCHC RBC AUTO-ENTMCNC: 32.9 G/DL (ref 28.4–34.8)
MCV RBC AUTO: 94.3 FL (ref 82.6–102.9)
METHADONE SCREEN, URINE: NEGATIVE
MONOCYTES # BLD: 6 % (ref 3–12)
NRBC AUTOMATED: 0 PER 100 WBC
OPIATES, URINE: NEGATIVE
OXYCODONE SCREEN URINE: NEGATIVE
PDW BLD-RTO: 12.9 % (ref 11.8–14.4)
PHENCYCLIDINE, URINE: NEGATIVE
PLATELET # BLD AUTO: 199 K/UL (ref 138–453)
PMV BLD AUTO: 10.9 FL (ref 8.1–13.5)
RBC # BLD: 4 M/UL (ref 3.95–5.11)
SEG NEUTROPHILS: 83 % (ref 36–65)
SEGMENTED NEUTROPHILS ABSOLUTE COUNT: 11.52 K/UL (ref 1.5–8.1)
T PALLIDUM AB SER QL IA: NONREACTIVE
TEST INFORMATION: NORMAL
WBC # BLD AUTO: 14 K/UL (ref 3.5–11.3)

## 2023-04-06 PROCEDURE — 86780 TREPONEMA PALLIDUM: CPT

## 2023-04-06 PROCEDURE — 6360000002 HC RX W HCPCS: Performed by: STUDENT IN AN ORGANIZED HEALTH CARE EDUCATION/TRAINING PROGRAM

## 2023-04-06 PROCEDURE — 6360000002 HC RX W HCPCS

## 2023-04-06 PROCEDURE — 3700000025 EPIDURAL BLOCK: Performed by: ANESTHESIOLOGY

## 2023-04-06 PROCEDURE — 1220000000 HC SEMI PRIVATE OB R&B

## 2023-04-06 PROCEDURE — 6360000002 HC RX W HCPCS: Performed by: NURSE ANESTHETIST, CERTIFIED REGISTERED

## 2023-04-06 PROCEDURE — 86900 BLOOD TYPING SEROLOGIC ABO: CPT

## 2023-04-06 PROCEDURE — 2500000003 HC RX 250 WO HCPCS: Performed by: NURSE ANESTHETIST, CERTIFIED REGISTERED

## 2023-04-06 PROCEDURE — 6370000000 HC RX 637 (ALT 250 FOR IP): Performed by: STUDENT IN AN ORGANIZED HEALTH CARE EDUCATION/TRAINING PROGRAM

## 2023-04-06 PROCEDURE — 6360000002 HC RX W HCPCS: Performed by: ANESTHESIOLOGY

## 2023-04-06 PROCEDURE — 86850 RBC ANTIBODY SCREEN: CPT

## 2023-04-06 PROCEDURE — 85025 COMPLETE CBC W/AUTO DIFF WBC: CPT

## 2023-04-06 PROCEDURE — 2580000003 HC RX 258

## 2023-04-06 PROCEDURE — 86901 BLOOD TYPING SEROLOGIC RH(D): CPT

## 2023-04-06 PROCEDURE — 80307 DRUG TEST PRSMV CHEM ANLYZR: CPT

## 2023-04-06 RX ORDER — SODIUM CHLORIDE 9 MG/ML
25 INJECTION, SOLUTION INTRAVENOUS PRN
Status: DISCONTINUED | OUTPATIENT
Start: 2023-04-06 | End: 2023-04-07

## 2023-04-06 RX ORDER — ONDANSETRON 2 MG/ML
4 INJECTION INTRAMUSCULAR; INTRAVENOUS EVERY 6 HOURS PRN
Status: DISCONTINUED | OUTPATIENT
Start: 2023-04-06 | End: 2023-04-07

## 2023-04-06 RX ORDER — NALOXONE HYDROCHLORIDE 0.4 MG/ML
INJECTION, SOLUTION INTRAMUSCULAR; INTRAVENOUS; SUBCUTANEOUS PRN
Status: DISCONTINUED | OUTPATIENT
Start: 2023-04-06 | End: 2023-04-07

## 2023-04-06 RX ORDER — SENNA AND DOCUSATE SODIUM 50; 8.6 MG/1; MG/1
2 TABLET, FILM COATED ORAL DAILY PRN
Status: DISCONTINUED | OUTPATIENT
Start: 2023-04-06 | End: 2023-04-07

## 2023-04-06 RX ORDER — ROPIVACAINE HYDROCHLORIDE 2 MG/ML
INJECTION, SOLUTION EPIDURAL; INFILTRATION; PERINEURAL CONTINUOUS PRN
Status: DISCONTINUED | OUTPATIENT
Start: 2023-04-06 | End: 2023-04-07 | Stop reason: SDUPTHER

## 2023-04-06 RX ORDER — SODIUM CHLORIDE 0.9 % (FLUSH) 0.9 %
5-40 SYRINGE (ML) INJECTION PRN
Status: DISCONTINUED | OUTPATIENT
Start: 2023-04-06 | End: 2023-04-07

## 2023-04-06 RX ORDER — SODIUM CHLORIDE 0.9 % (FLUSH) 0.9 %
5-40 SYRINGE (ML) INJECTION EVERY 12 HOURS SCHEDULED
Status: DISCONTINUED | OUTPATIENT
Start: 2023-04-06 | End: 2023-04-07

## 2023-04-06 RX ORDER — ROPIVACAINE HYDROCHLORIDE 2 MG/ML
INJECTION, SOLUTION EPIDURAL; INFILTRATION; PERINEURAL
Status: COMPLETED
Start: 2023-04-06 | End: 2023-04-07

## 2023-04-06 RX ORDER — TERBUTALINE SULFATE 1 MG/ML
INJECTION, SOLUTION SUBCUTANEOUS
Status: DISCONTINUED
Start: 2023-04-06 | End: 2023-04-06 | Stop reason: WASHOUT

## 2023-04-06 RX ORDER — LIDOCAINE HYDROCHLORIDE AND EPINEPHRINE 15; 5 MG/ML; UG/ML
INJECTION, SOLUTION EPIDURAL PRN
Status: DISCONTINUED | OUTPATIENT
Start: 2023-04-06 | End: 2023-04-07 | Stop reason: SDUPTHER

## 2023-04-06 RX ORDER — ROPIVACAINE HYDROCHLORIDE 2 MG/ML
INJECTION, SOLUTION EPIDURAL; INFILTRATION; PERINEURAL PRN
Status: DISCONTINUED | OUTPATIENT
Start: 2023-04-06 | End: 2023-04-07 | Stop reason: SDUPTHER

## 2023-04-06 RX ORDER — SODIUM CHLORIDE, SODIUM LACTATE, POTASSIUM CHLORIDE, AND CALCIUM CHLORIDE .6; .31; .03; .02 G/100ML; G/100ML; G/100ML; G/100ML
500 INJECTION, SOLUTION INTRAVENOUS PRN
Status: DISCONTINUED | OUTPATIENT
Start: 2023-04-06 | End: 2023-04-07

## 2023-04-06 RX ORDER — SODIUM CHLORIDE, SODIUM LACTATE, POTASSIUM CHLORIDE, CALCIUM CHLORIDE 600; 310; 30; 20 MG/100ML; MG/100ML; MG/100ML; MG/100ML
INJECTION, SOLUTION INTRAVENOUS CONTINUOUS
Status: DISCONTINUED | OUTPATIENT
Start: 2023-04-06 | End: 2023-04-07

## 2023-04-06 RX ORDER — ACYCLOVIR 200 MG/1
400 CAPSULE ORAL 3 TIMES DAILY
Status: DISCONTINUED | OUTPATIENT
Start: 2023-04-06 | End: 2023-04-06 | Stop reason: CLARIF

## 2023-04-06 RX ORDER — SODIUM CHLORIDE, SODIUM LACTATE, POTASSIUM CHLORIDE, AND CALCIUM CHLORIDE .6; .31; .03; .02 G/100ML; G/100ML; G/100ML; G/100ML
1000 INJECTION, SOLUTION INTRAVENOUS PRN
Status: DISCONTINUED | OUTPATIENT
Start: 2023-04-06 | End: 2023-04-07

## 2023-04-06 RX ORDER — ONDANSETRON 2 MG/ML
4 INJECTION INTRAMUSCULAR; INTRAVENOUS EVERY 6 HOURS PRN
Status: DISCONTINUED | OUTPATIENT
Start: 2023-04-06 | End: 2023-04-06 | Stop reason: SDUPTHER

## 2023-04-06 RX ORDER — ACETAMINOPHEN 500 MG
1000 TABLET ORAL EVERY 6 HOURS PRN
Status: DISCONTINUED | OUTPATIENT
Start: 2023-04-06 | End: 2023-04-07

## 2023-04-06 RX ORDER — VALACYCLOVIR HYDROCHLORIDE 500 MG/1
500 TABLET, FILM COATED ORAL 2 TIMES DAILY
Status: DISCONTINUED | OUTPATIENT
Start: 2023-04-06 | End: 2023-04-07

## 2023-04-06 RX ADMIN — VALACYCLOVIR HYDROCHLORIDE 500 MG: 500 TABLET, FILM COATED ORAL at 09:07

## 2023-04-06 RX ADMIN — SODIUM CHLORIDE, POTASSIUM CHLORIDE, SODIUM LACTATE AND CALCIUM CHLORIDE: 600; 310; 30; 20 INJECTION, SOLUTION INTRAVENOUS at 08:17

## 2023-04-06 RX ADMIN — Medication 8 ML: at 10:40

## 2023-04-06 RX ADMIN — SODIUM CHLORIDE, POTASSIUM CHLORIDE, SODIUM LACTATE AND CALCIUM CHLORIDE: 600; 310; 30; 20 INJECTION, SOLUTION INTRAVENOUS at 10:47

## 2023-04-06 RX ADMIN — Medication 1000 MG: at 16:10

## 2023-04-06 RX ADMIN — LIDOCAINE HYDROCHLORIDE,EPINEPHRINE BITARTRATE 3 ML: 15; .005 INJECTION, SOLUTION EPIDURAL; INFILTRATION; INTRACAUDAL; PERINEURAL at 10:34

## 2023-04-06 RX ADMIN — ROPIVACAINE HYDROCHLORIDE 10 ML/HR: 2 INJECTION, SOLUTION EPIDURAL; INFILTRATION; PERINEURAL at 10:40

## 2023-04-06 RX ADMIN — Medication 2000 MG: at 08:34

## 2023-04-06 RX ADMIN — VALACYCLOVIR HYDROCHLORIDE 500 MG: 500 TABLET, FILM COATED ORAL at 21:07

## 2023-04-06 RX ADMIN — DOCUSATE SODIUM 50 MG AND SENNOSIDES 8.6 MG 2 TABLET: 8.6; 5 TABLET, FILM COATED ORAL at 22:37

## 2023-04-06 RX ADMIN — Medication 1 MILLI-UNITS/MIN: at 18:02

## 2023-04-06 RX ADMIN — ROPIVACAINE HYDROCHLORIDE 10 ML/HR: 2 INJECTION, SOLUTION EPIDURAL; INFILTRATION at 18:10

## 2023-04-06 NOTE — ANESTHESIA PRE PROCEDURE
HCT 37.7 04/06/2023 08:34 AM    MCV 94.3 04/06/2023 08:34 AM    RDW 12.9 04/06/2023 08:34 AM     04/06/2023 08:34 AM       CMP:   Lab Results   Component Value Date/Time     03/18/2023 02:19 AM    K 3.4 03/18/2023 02:19 AM     03/18/2023 02:19 AM    CO2 21 03/18/2023 02:19 AM    BUN 9 03/18/2023 02:19 AM    CREATININE 0.64 03/18/2023 02:19 AM    GFRAA >60 02/03/2022 03:13 AM    LABGLOM >60 03/18/2023 02:19 AM    GLUCOSE 94 03/18/2023 02:19 AM    PROT 7.1 03/18/2023 02:19 AM    CALCIUM 9.0 03/18/2023 02:19 AM    BILITOT 0.4 03/18/2023 02:19 AM    ALKPHOS 141 03/18/2023 02:19 AM    AST 17 03/18/2023 02:19 AM    ALT 11 03/18/2023 02:19 AM       POC Tests: No results for input(s): POCGLU, POCNA, POCK, POCCL, POCBUN, POCHEMO, POCHCT in the last 72 hours. Coags: No results found for: PROTIME, INR, APTT    HCG (If Applicable): No results found for: PREGTESTUR, PREGSERUM, HCG, HCGQUANT     ABGs: No results found for: PHART, PO2ART, YLW8VXH, BFN2CGH, BEART, L4RIZJID     Type & Screen (If Applicable):  No results found for: LABABO, LABRH    Drug/Infectious Status (If Applicable):  No results found for: HIV, HEPCAB    COVID-19 Screening (If Applicable): No results found for: COVID19        Anesthesia Evaluation  Patient summary reviewed  Airway: Mallampati: II  TM distance: >3 FB   Neck ROM: full  Mouth opening: > = 3 FB   Dental: normal exam         Pulmonary:Negative Pulmonary ROS and normal exam                               Cardiovascular:Negative CV ROS                      Neuro/Psych:   Negative Neuro/Psych ROS              GI/Hepatic/Renal: Neg GI/Hepatic/Renal ROS            Endo/Other: Negative Endo/Other ROS                    Abdominal:             Vascular: negative vascular ROS. Other Findings:           Anesthesia Plan      epidural     ASA 2             Anesthetic plan and risks discussed with patient.               Post-op pain plan if not by surgeon: continuous

## 2023-04-06 NOTE — CARE COORDINATION
ANTEPARTUM NOTE    40 weeks gestation of pregnancy [I5U.37]    Elsa was admitted to L&D on 4/6/23 for SROM w/ contractions @ 40w2d    OB GYN Provider: Ben Ramachandran    Will meet with patient after delivery to verify name/address/phone/insurance and discuss discharge planning. Anticipate DC home 2 nights after vaginal delivery or 4 nights after C/S delivery as long as hemodynamically stable.

## 2023-04-06 NOTE — DISCHARGE SUMMARY
Obstetric Discharge Summary  [facility]    Patient Name: Bhavna Hutson  Patient : 1990  Primary Care Physician: Sylvie Calero DO  Admit Date: 2023    Principal Diagnosis: IUP at 40w2d, admitted for spontaneous labor     Her pregnancy has been complicated by:   Patient Active Problem List   Diagnosis    Insufficient prenatal care    Genital herpes    RUQ pain    40 weeks gestation of pregnancy    Third degree laceration      23 M Apg 8/9 Wt 8#7       Infection Present?: No  Hospital Acquired: No    Surgical Operations & Procedures:  Analgesia: epidural  Delivery Type: Spontaneous Vaginal Delivery: See Labor and Delivery Summary   Laceration(s): 3B perineal laceration    Consultations:  Anesthesia    Pertinent Findings & Procedures:   Elsa Cagle is a 26 y.o. female  at 41w4d admitted for spontaneous labor with SROM @ 0330 on 23; received Ancef for GBS prophylaxis given PCN allergy, pitocin, IUPC, epidural, FSE. She delivered by spontaneous vaginal a Live Born infant on 23. She received a dose of Ancef postpartum for OASIS. Information for the patient's :  Triny Hunter [[id number]]   male   Birth Weight: 8 lb 7.3 oz (3.835 kg)     Apgars: 8 at 1 minute and 9 at 5 minutes.      Postpartum course: normal.      Course of patient: complicated by third degree perineal deceleration    Discharge to: Home    Readmission planned: no     Recommendations on Discharge:     Medications:      Medication List        START taking these medications      ibuprofen 800 MG tablet  Commonly known as: ADVIL;MOTRIN  Take 1 tablet by mouth every 8 hours as needed for Pain     senna-docusate 8.6-50 MG per tablet  Commonly known as: PERICOLACE  Take 1 tablet by mouth 2 times daily as needed for Constipation            CONTINUE taking these medications      acetaminophen 500 MG tablet  Commonly known as: TYLENOL  Take 2 tablets by mouth in the

## 2023-04-06 NOTE — ANESTHESIA PROCEDURE NOTES
Epidural Block    Patient location during procedure: OB  Start time: 4/6/2023 10:28 AM  End time: 4/6/2023 10:34 AM  Reason for block: labor epidural  Staffing  Performed: resident/CRNA   Anesthesiologist: Austin Velez MD  Resident/CRNA: ENRIQUETA Greene CRNA  Epidural  Patient position: sitting  Prep: Betadine  Patient monitoring: continuous pulse ox and frequent blood pressure checks  Approach: midline  Location: L4-5  Injection technique: OCTAVIO saline  Guidance: paresthesia technique  Provider prep: mask and sterile gloves  Needle  Needle type: Tuohy   Needle gauge: 17 G  Needle length: 3.5 in  Needle insertion depth: 4 cm  Catheter type: end hole  Catheter at skin depth: 12 cm  Test dose: negativeCatheter Secured: tegaderm and tape  Assessment  Hemodynamics: stable  Attempts: 1  Outcomes: uncomplicated and patient tolerated procedure well  Preanesthetic Checklist  Completed: patient identified, IV checked, site marked, risks and benefits discussed, surgical/procedural consents, equipment checked, pre-op evaluation, timeout performed, anesthesia consent given, oxygen available and monitors applied/VS acknowledged

## 2023-04-06 NOTE — FLOWSHEET NOTE
Pt to LD for c/o srom at 0330. Pt states having some vaginal bleeding \"around a quarter of a cup\" that concerned her that is why she came to our hospital versus [place] where her ob is. Pt states feeling positive fetal movement.

## 2023-04-06 NOTE — H&P
depression, negative anxiety    OBSTETRIC HISTORY:   OB History    Para Term  AB Living   1 0 0 0 0 0   SAB IAB Ectopic Molar Multiple Live Births   0 0 0 0 0 0      # Outcome Date GA Lbr Brien/2nd Weight Sex Delivery Anes PTL Lv   1 Current                PAST MEDICAL HISTORY:   has no past medical history on file. PAST SURGICAL HISTORY:   has no past surgical history on file. ALLERGIES:  is allergic to penicillins. MEDICATIONS:  Prior to Admission medications    Medication Sig Start Date End Date Taking? Authorizing Provider   acetaminophen (TYLENOL) 500 MG tablet Take 2 tablets by mouth in the morning and 2 tablets at noon and 2 tablets in the evening. 3/18/23 4/17/23  Kp Waldron MD     FAMILY HISTORY:  family history is not on file. SOCIAL HISTORY:   reports that she has never smoked. She has never used smokeless tobacco. She reports that she does not currently use alcohol. She reports that she does not use drugs.     VITALS:  Vitals:    23 1130 23 1200 23 1230 23 1300   BP: 113/78 123/63 (!) 95/48 99/63   Pulse: (!) 106 89 87 93   Resp: 18 18 18    Temp:  98.4 °F (36.9 °C)     TempSrc:  Oral     SpO2: 100% 100% 100% 100%   Weight:       Height:         PHYSICAL EXAM:  Fetal Heart Monitor:  Baseline Heart Rate 115, moderate variability, present accelerations, absent decelerations  Quail: irregular, every 2-6 minutes contractions    General appearance:  no apparent distress, alert, and cooperative  HEENT: head atraumatic, normocephalic, moist mucous membranes, trachea midline  Neurologic:  alert, oriented, normal speech, no focal findings or movement disorder noted  Lungs:  No increased work of breathing, good air exchange, clear to auscultation bilaterally, no crackles or wheezing  Heart:  regular rate and rhythm and no murmur    Abdomen:  soft, gravid, and non-tender  Extremities:  no calf tenderness, non edematous   Musculoskeletal: Gross strength equal and

## 2023-04-07 PROBLEM — Z3A.37 37 WEEKS GESTATION OF PREGNANCY: Status: RESOLVED | Noted: 2023-03-17 | Resolved: 2023-04-07

## 2023-04-07 PROCEDURE — 0DQR0ZZ REPAIR ANAL SPHINCTER, OPEN APPROACH: ICD-10-PCS | Performed by: STUDENT IN AN ORGANIZED HEALTH CARE EDUCATION/TRAINING PROGRAM

## 2023-04-07 PROCEDURE — 6370000000 HC RX 637 (ALT 250 FOR IP): Performed by: STUDENT IN AN ORGANIZED HEALTH CARE EDUCATION/TRAINING PROGRAM

## 2023-04-07 PROCEDURE — 6360000002 HC RX W HCPCS

## 2023-04-07 PROCEDURE — 1220000000 HC SEMI PRIVATE OB R&B

## 2023-04-07 PROCEDURE — 2580000003 HC RX 258: Performed by: STUDENT IN AN ORGANIZED HEALTH CARE EDUCATION/TRAINING PROGRAM

## 2023-04-07 PROCEDURE — 10H07YZ INSERTION OF OTHER DEVICE INTO PRODUCTS OF CONCEPTION, VIA NATURAL OR ARTIFICIAL OPENING: ICD-10-PCS | Performed by: STUDENT IN AN ORGANIZED HEALTH CARE EDUCATION/TRAINING PROGRAM

## 2023-04-07 PROCEDURE — 2580000003 HC RX 258

## 2023-04-07 PROCEDURE — 88307 TISSUE EXAM BY PATHOLOGIST: CPT

## 2023-04-07 PROCEDURE — 7200000001 HC VAGINAL DELIVERY

## 2023-04-07 PROCEDURE — 10H073Z INSERTION OF MONITORING ELECTRODE INTO PRODUCTS OF CONCEPTION, VIA NATURAL OR ARTIFICIAL OPENING: ICD-10-PCS | Performed by: STUDENT IN AN ORGANIZED HEALTH CARE EDUCATION/TRAINING PROGRAM

## 2023-04-07 RX ORDER — SODIUM CHLORIDE 0.9 % (FLUSH) 0.9 %
5-40 SYRINGE (ML) INJECTION PRN
Status: DISCONTINUED | OUTPATIENT
Start: 2023-04-07 | End: 2023-04-08 | Stop reason: HOSPADM

## 2023-04-07 RX ORDER — IBUPROFEN 600 MG/1
600 TABLET ORAL EVERY 6 HOURS PRN
Status: DISCONTINUED | OUTPATIENT
Start: 2023-04-07 | End: 2023-04-08 | Stop reason: HOSPADM

## 2023-04-07 RX ORDER — LANOLIN 72 %
OINTMENT (GRAM) TOPICAL PRN
Status: DISCONTINUED | OUTPATIENT
Start: 2023-04-07 | End: 2023-04-08 | Stop reason: HOSPADM

## 2023-04-07 RX ORDER — IBUPROFEN 800 MG/1
800 TABLET ORAL EVERY 8 HOURS PRN
Qty: 30 TABLET | Refills: 0 | Status: SHIPPED | OUTPATIENT
Start: 2023-04-07

## 2023-04-07 RX ORDER — ONDANSETRON 2 MG/ML
4 INJECTION INTRAMUSCULAR; INTRAVENOUS EVERY 4 HOURS PRN
Status: DISCONTINUED | OUTPATIENT
Start: 2023-04-07 | End: 2023-04-08 | Stop reason: HOSPADM

## 2023-04-07 RX ORDER — BISACODYL 10 MG
10 SUPPOSITORY, RECTAL RECTAL DAILY PRN
Status: DISCONTINUED | OUTPATIENT
Start: 2023-04-07 | End: 2023-04-08 | Stop reason: HOSPADM

## 2023-04-07 RX ORDER — SODIUM CHLORIDE 9 MG/ML
INJECTION, SOLUTION INTRAVENOUS PRN
Status: DISCONTINUED | OUTPATIENT
Start: 2023-04-07 | End: 2023-04-08 | Stop reason: HOSPADM

## 2023-04-07 RX ORDER — SENNA AND DOCUSATE SODIUM 50; 8.6 MG/1; MG/1
2 TABLET, FILM COATED ORAL 2 TIMES DAILY
Status: DISCONTINUED | OUTPATIENT
Start: 2023-04-07 | End: 2023-04-08 | Stop reason: HOSPADM

## 2023-04-07 RX ORDER — AMOXICILLIN 250 MG
1 CAPSULE ORAL 2 TIMES DAILY PRN
Qty: 60 TABLET | Refills: 1 | Status: SHIPPED | OUTPATIENT
Start: 2023-04-07

## 2023-04-07 RX ORDER — ACETAMINOPHEN 500 MG
1000 TABLET ORAL EVERY 6 HOURS
Status: DISCONTINUED | OUTPATIENT
Start: 2023-04-07 | End: 2023-04-08 | Stop reason: HOSPADM

## 2023-04-07 RX ORDER — SIMETHICONE 80 MG
80 TABLET,CHEWABLE ORAL EVERY 6 HOURS PRN
Status: DISCONTINUED | OUTPATIENT
Start: 2023-04-07 | End: 2023-04-08 | Stop reason: HOSPADM

## 2023-04-07 RX ORDER — SODIUM CHLORIDE 0.9 % (FLUSH) 0.9 %
5-40 SYRINGE (ML) INJECTION EVERY 12 HOURS SCHEDULED
Status: DISCONTINUED | OUTPATIENT
Start: 2023-04-07 | End: 2023-04-08 | Stop reason: HOSPADM

## 2023-04-07 RX ADMIN — Medication 87.3 MILLI-UNITS/MIN: at 02:08

## 2023-04-07 RX ADMIN — ACETAMINOPHEN 1000 MG: 500 TABLET ORAL at 03:59

## 2023-04-07 RX ADMIN — Medication 1000 MG: at 00:00

## 2023-04-07 RX ADMIN — IBUPROFEN 600 MG: 600 TABLET, FILM COATED ORAL at 20:33

## 2023-04-07 RX ADMIN — BENZOCAINE AND LEVOMENTHOL: 200; 5 SPRAY TOPICAL at 08:33

## 2023-04-07 RX ADMIN — Medication 166.7 ML: at 01:57

## 2023-04-07 RX ADMIN — SODIUM CHLORIDE, PRESERVATIVE FREE 10 ML: 5 INJECTION INTRAVENOUS at 20:34

## 2023-04-07 RX ADMIN — IBUPROFEN 600 MG: 600 TABLET, FILM COATED ORAL at 03:33

## 2023-04-07 RX ADMIN — Medication 2000 MG: at 08:21

## 2023-04-07 RX ADMIN — DOCUSATE SODIUM 50 MG AND SENNOSIDES 8.6 MG 2 TABLET: 8.6; 5 TABLET, FILM COATED ORAL at 10:53

## 2023-04-07 RX ADMIN — SODIUM CHLORIDE, PRESERVATIVE FREE 5 ML: 5 INJECTION INTRAVENOUS at 08:30

## 2023-04-07 RX ADMIN — IBUPROFEN 600 MG: 600 TABLET, FILM COATED ORAL at 10:53

## 2023-04-07 RX ADMIN — ACETAMINOPHEN 1000 MG: 500 TABLET ORAL at 15:41

## 2023-04-07 ASSESSMENT — PAIN SCALES - GENERAL
PAINLEVEL_OUTOF10: 5
PAINLEVEL_OUTOF10: 0
PAINLEVEL_OUTOF10: 2
PAINLEVEL_OUTOF10: 6
PAINLEVEL_OUTOF10: 5

## 2023-04-07 ASSESSMENT — PAIN DESCRIPTION - LOCATION: LOCATION: PERINEUM

## 2023-04-07 ASSESSMENT — PAIN DESCRIPTION - DESCRIPTORS: DESCRIPTORS: SORE

## 2023-04-07 NOTE — FLOWSHEET NOTE
Pt is feeling every contraction and able to move both legs. Pt verbalized pressing the bolus does not give her any pain relief. She requested for anesthesia team to check her epidural. Called CRNA and informed about this.

## 2023-04-07 NOTE — FLOWSHEET NOTE
Pt is feeling pressure and urge to push with every contraction, moderate amount of bloody show noted. Called out for resident. Prolonged decels noted that appears to be around 5 minutes due to broken tracing. Pitocin off at 2210H. SVE done, progress to 8-9/90/0.

## 2023-04-07 NOTE — ANESTHESIA POSTPROCEDURE EVALUATION
Department of Anesthesiology  Postprocedure Note    Patient: Bhavna Hutson  MRN: [id number]  YOB: 1990  Date of evaluation: 4/7/2023      Procedure Summary     Date: 04/06/23 Room / Location:     Anesthesia Start: 8241 Anesthesia Stop: 04/07/23 0153    Procedure: Labor Analgesia Diagnosis:     Scheduled Providers:  Responsible Provider: Samy Carrillo MD    Anesthesia Type: epidural ASA Status: 2          Anesthesia Type: No value filed.     Jenni Phase I: Jenni Score: 10    Jenni Phase II: Jenni Score: 10      Anesthesia Post Evaluation    Patient location during evaluation: floor  Patient participation: complete - patient participated  Level of consciousness: awake and alert  Pain score: 3  Airway patency: patent  Nausea & Vomiting: no nausea and no vomiting  Complications: no  Cardiovascular status: hemodynamically stable and blood pressure returned to baseline  Respiratory status: acceptable and room air  Hydration status: stable  Multimodal analgesia pain management approach

## 2023-04-07 NOTE — CARE COORDINATION
Social Work     Sw reviewed medical record (current active problem list) and tox screens and found no current concerns. Sw consulted for limited PNC. Mom reports her [facility] was at [facility] medica with Dr. Jael Brice. Sw spoke with mom briefly to explain Sw role, inquire if any needs or concerns, and provide safe sleep education and discuss. Mom denied any needs or questions and informs baby will have a safe sleep environment via her insurance that will provide a PNP. Sw discussed that mom must have a crib, pnp or bassinet for dc. Mom states she will borrow a bassinet from her sister until next week when she obtains her new PNP. Mom denied any current s/s of anxiety or depression and is aware to reach out to OB if any s/s occur after dc. Mom did discuss she is an over thinker, and she did ask many questions related to new baby. Sw answered questions and also discussed S/s of PPD and PPA and the importance of calling OB from OP if escalations occur. Mom reports a really good support system with her parents, sisters, and fob (who works in construction in [employer] denied any current questions or needs. Mom reports this is her 1st child. Mom and baby will reside alone, but mom is thinking about staying with he sister for initial support. Mom states ped is not yet chosen, she will need list from CM. Sw encouraged mom to reach out if any issues or concerns arise.

## 2023-04-07 NOTE — CARE COORDINATION
CASE MANAGEMENT POST-PARTUM TRANSITIONAL CARE PLAN    40 weeks gestation of pregnancy [Z3A.40]    OB Provider: Lianajanineedwardo Bartlett met w/ Kareen at bedside to discuss DCP. She is S/P  on 23 @ 40w3d at 200 of male infant    Writer verified address ok to keep on our records for mailing purposes. CM asked if this was where she lived and she stated yes. Updated phone number. She states she lives alone. Kareen verbalized no difficulties with transportation to and from doctors appointments or with paying for medications upon discharge home. [employer] insurance correct. Writer notified Kareen she has 30 days from date of birth to add  to insurance policy by contacting [address] [place] [address] Road. She verbalized understanding. Kareen verbalized she does not have a safe place for infant to sleep at home. She said she spoke with our [facility] who also informed her she needs one in order to take baby home. She said she has a sister who lives close by she may be able to borrow a PNP from but that she found out she is eligible to receive a free PNP if she takes a class, but said would not be able to obtain that because its the weekend. CM did reinforce no co-sleeping and she must have a safe place for baby to sleep at home. She verbalized understanding    Infant name on BC: undecided. She said she and AMERICA Vasquez have not decided on a name yet  .    Infant PCP Undecided, list provided     DME: none  HOME CARE: none    Anticipate DC 23    Readmission Risk              Risk of Unplanned Readmission:  5

## 2023-04-07 NOTE — LACTATION NOTE
Breastfeeding packet given and reviewed, pt says last feed was painful. Reviewed signs of deep latch and correcting if painful. Encouraged lots of skin to skin, frequent attempts, reviewed expectations of feeding, and call out for assistance as needed.

## 2023-04-07 NOTE — FLOWSHEET NOTE
Pt been feeling some urge to push and pressure. Called resident to examine pt. Dr. Leslie Kim at bedside, SVE done, same VE findings. Repositioned pt sitting up, deceleration noted, from baseline of 125 bpm down to 90s bpm. Repositioned at right lateral side, still FHT not recovering, IV bolus given. Repositioned back to left lateral side, FHT recovered back to baseline. Prolonged decels noted over 4 minutes. Dr. Rod Yeboah currently at bedside.

## 2023-04-07 NOTE — L&D DELIVERY NOTE
Vaginal Delivery Note  Department of Obstetrics and Gynecology  [facility]       Patient: Collin Ingram   : 1990  MRN: [id number]   Date of delivery: 23     Pre-operative Diagnosis: Elsa Marin U5L3 at 40w3d  Spontaneous labor with SROM  Category 2 fetal heart tones  Suboptimal dating  Genital HSV  Polyhydramnios  Limited prenatal care  BMI 25    Post-operative Diagnosis:  same as above and  living infant (male), placental abruption    Delivering Obstetrician & Assistant(s): Attila Espinosa DO, PGY4; Alix Dorsey DO, PGY2    Infant Information:   Information for the patient's :  Dia Hunter [[id number]]      Information for the patient's :  Dia Hunter [[id number]]        Apgar scores: 8 at 1 minute and 9 at 5 minutes. Anesthesia:  epidural anesthesia    Application and Delivery:    She was known to be GBS bacteriuria and received Ancef for antibiotic prophylaxis. The patient progressed well, received an epidural, became complete and felt the urge to push. After pushing with contractions the fetal head delivered Cephalic, left occiput anterior over an intact perineum, nuchal cord was not present. The anterior, then posterior shoulder delivered easily and atraumatically followed by the rest of the infant. Nose and mouth suctioned with bulb suction, infant was stimulated and dried. Cord was clamped and cut after one minute delayed cord clamping and infant was placed on maternal abdomen, and attended by RN for evaluation. The delivery of the placenta was spontaneous and appeared intact, whole, and that the umbilical cord had three vessels noted. Upon further evaluation, placental abruption was noted. Pitocin was started. The vagina was swept of all clots and debris.  The perineum and vagina were evaluated and a midline 3rd degree perineal laceration was found and repaired in standard fashion with multiple 2-0

## 2023-04-07 NOTE — FLOWSHEET NOTE
Transferred pt out to [facility] at room 745 via wheelchair with baby wrapped around arms, well contracted uterus, minimal bleeding, ice pack applied as requested by pt, Endorsed to RN in charge for continuity of care.

## 2023-04-07 NOTE — ANESTHESIA PROCEDURE NOTES
Epidural Block    Patient location during procedure: OB  Start time: 4/6/2023 11:15 PM  End time: 4/6/2023 11:30 PM  Reason for block: labor epidural  Staffing  Performed: anesthesiologist   Anesthesiologist: Karissa Teixeira MD  Epidural  Patient position: sitting  Patient monitoring: cardiac monitor, continuous pulse ox and frequent blood pressure checks  Approach: midline  Location: L4-5  Injection technique: OCTAVIO air  Provider prep: mask and sterile gloves  Needle  Needle type: Tuohy   Needle gauge: 17 G  Needle length: 2.5 in  Catheter type: end hole  Catheter size: 19 G  Test dose: negativeCatheter Secured: tegaderm and tape  Assessment  Hemodynamics: stable  Attempts: 1  Outcomes: uncomplicated and patient tolerated procedure well  Preanesthetic Checklist  Completed: patient identified, IV checked, site marked, risks and benefits discussed, surgical/procedural consents, equipment checked, pre-op evaluation, timeout performed, anesthesia consent given, oxygen available, monitors applied/VS acknowledged, fire risk safety assessment completed and verbalized and blood product R/B/A discussed and consented

## 2023-04-07 NOTE — FLOWSHEET NOTE
2315 Dr. Jai Baird, at bedside. Pt requesting for Epidural to be checked, Anesthesia advised reinsertion. Explained to pt and agreed for it. Informed Dr. Apolinar Rogers and agreed for the reinsertion of Epidural.     Epidural procedure explained, risks discussed. 2318 patient positioned for epidural. 2319 Old epidural catheter removed by Dr. Jai Baird. 2318 Time out completed. 2321 catheter placed. 2322 test dose given. Epidural catheter taped and secured per anesthesia. 2325 to low fowlers with left uterine displacement. 2328 pump initiated. Pt tolerated procedure well.

## 2023-04-08 VITALS
TEMPERATURE: 98.2 F | HEART RATE: 93 BPM | RESPIRATION RATE: 16 BRPM | SYSTOLIC BLOOD PRESSURE: 103 MMHG | BODY MASS INDEX: 25.61 KG/M2 | OXYGEN SATURATION: 99 % | HEIGHT: 64 IN | DIASTOLIC BLOOD PRESSURE: 59 MMHG | WEIGHT: 150 LBS

## 2023-04-08 PROCEDURE — 2580000003 HC RX 258: Performed by: STUDENT IN AN ORGANIZED HEALTH CARE EDUCATION/TRAINING PROGRAM

## 2023-04-08 PROCEDURE — 6370000000 HC RX 637 (ALT 250 FOR IP): Performed by: STUDENT IN AN ORGANIZED HEALTH CARE EDUCATION/TRAINING PROGRAM

## 2023-04-08 RX ADMIN — ACETAMINOPHEN 1000 MG: 500 TABLET ORAL at 10:12

## 2023-04-08 RX ADMIN — DOCUSATE SODIUM 50 MG AND SENNOSIDES 8.6 MG 2 TABLET: 8.6; 5 TABLET, FILM COATED ORAL at 10:13

## 2023-04-08 RX ADMIN — MAGNESIUM HYDROXIDE 30 ML: 400 SUSPENSION ORAL at 10:13

## 2023-04-08 RX ADMIN — SODIUM CHLORIDE, PRESERVATIVE FREE 10 ML: 5 INJECTION INTRAVENOUS at 10:16

## 2023-04-08 RX ADMIN — IBUPROFEN 600 MG: 600 TABLET, FILM COATED ORAL at 10:13

## 2023-04-08 RX ADMIN — DOCUSATE SODIUM 50 MG AND SENNOSIDES 8.6 MG 2 TABLET: 8.6; 5 TABLET, FILM COATED ORAL at 01:23

## 2023-04-08 RX ADMIN — ACETAMINOPHEN 1000 MG: 500 TABLET ORAL at 01:22

## 2023-04-08 ASSESSMENT — PAIN DESCRIPTION - LOCATION
LOCATION: PERINEUM
LOCATION: PERINEUM

## 2023-04-08 ASSESSMENT — PAIN DESCRIPTION - ORIENTATION: ORIENTATION: LOWER

## 2023-04-08 ASSESSMENT — PAIN DESCRIPTION - DESCRIPTORS
DESCRIPTORS: SORE
DESCRIPTORS: SORE

## 2023-04-08 ASSESSMENT — PAIN SCALES - GENERAL
PAINLEVEL_OUTOF10: 6
PAINLEVEL_OUTOF10: 4

## 2023-04-08 NOTE — FLOWSHEET NOTE
Patient discharged to home. Instructions given. Meds to beds delivered to patient by pharmacy. Post birth warning signs reviewed with patient. Patient verbalized understanding. Patient stating her family can't bring her a car seat to take baby home. Patient states she is going to leave to  a car seat for baby. She states she is driving herself and will be back in one hour to take baby home. Advised patient against driving and she verbalized understanding. Infant taken to nursery. Patient ambulatory with all belongings to [facility].

## 2023-04-08 NOTE — PLAN OF CARE
Problem: Pain  Goal: Verbalizes/displays adequate comfort level or baseline comfort level  Outcome: Progressing     Problem: Safety - Adult  Goal: Free from fall injury  Outcome: Progressing     Problem: Postpartum  Goal: Experiences normal postpartum course  Description:  Postpartum OB-Pregnancy care plan goal which identifies if the mother is experiencing a normal postpartum course  Outcome: Progressing  Goal: Appropriate maternal -  bonding  Description:  Postpartum OB-Pregnancy care plan goal which identifies if the mother and  are bonding appropriately  Outcome: Progressing  Goal: Establishment of infant feeding pattern  Description:  Postpartum OB-Pregnancy care plan goal which identifies if the mother is establishing a feeding pattern with their   Outcome: Progressing  Goal: Incisions, wounds, or drain sites healing without S/S of infection  Outcome: Progressing     Problem: Infection - Adult  Goal: Absence of infection at discharge  Outcome: Progressing  Goal: Absence of infection during hospitalization  Outcome: Progressing  Goal: Absence of fever/infection during anticipated neutropenic period  Outcome: Progressing     Problem: Discharge Planning  Goal: Discharge to home or other facility with appropriate resources  Outcome: Progressing     Problem: Chronic Conditions and Co-morbidities  Goal: Patient's chronic conditions and co-morbidity symptoms are monitored and maintained or improved  Outcome: Progressing     Problem: Vaginal Birth or  Section  Goal: Fetal and maternal status remain reassuring during the birth process  Description:  Birth OB-Pregnancy care plan goal which identifies if the fetal and maternal status remain reassuring during the birth process  Outcome: Completed

## 2023-04-08 NOTE — LACTATION NOTE
Pt c/o sore nipples. Reviewed deep latch and pt sates she has not been latching baby deeply as she has had pain throughout feed. Reviewed deep latch. Deep latch education videos given to pt to review. Reassured pt normal  feeding patterns and how to know if baby is getting enough. Encouraged pt to call out next feed.

## 2023-04-08 NOTE — PLAN OF CARE
Problem: Pain  Goal: Verbalizes/displays adequate comfort level or baseline comfort level  2023 1403 by Mariaelena Paul RN  Outcome: Progressing  2023 0500 by Beny Reid RN  Outcome: Progressing     Problem: Safety - Adult  Goal: Free from fall injury  2023 1403 by Mariaelena Paul RN  Outcome: Progressing  2023 0500 by Beny Reid RN  Outcome: Progressing     Problem: Postpartum  Goal: Experiences normal postpartum course  Description:  Postpartum OB-Pregnancy care plan goal which identifies if the mother is experiencing a normal postpartum course  2023 1403 by Mariaelena Paul RN  Outcome: Progressing  2023 0500 by Beny Reid RN  Outcome: Progressing  Goal: Appropriate maternal -  bonding  Description:  Postpartum OB-Pregnancy care plan goal which identifies if the mother and  are bonding appropriately  2023 1403 by Mariaelena Paul RN  Outcome: Progressing  2023 0500 by Beny Reid RN  Outcome: Progressing  Goal: Establishment of infant feeding pattern  Description:  Postpartum OB-Pregnancy care plan goal which identifies if the mother is establishing a feeding pattern with their   2023 1403 by Mariaelena Paul RN  Outcome: Progressing  2023 0500 by Beny Reid RN  Outcome: Progressing  Goal: Incisions, wounds, or drain sites healing without S/S of infection  2023 1403 by Mariaelena Paul RN  Outcome: Progressing  2023 0500 by Beny Reid RN  Outcome: Progressing     Problem: Infection - Adult  Goal: Absence of infection at discharge  2023 1403 by Mariaelena Paul RN  Outcome: Progressing  2023 0500 by Beny Reid RN  Outcome: Progressing  Goal: Absence of infection during hospitalization  2023 1403 by Mariaelena Paul RN  Outcome: Progressing  2023 0500 by Beny Reid RN  Outcome: Progressing  Goal: Absence of fever/infection during anticipated neutropenic period  2023 1403 by Mariaelena Paul

## 2023-04-08 NOTE — PROGRESS NOTES
BECKY Labor Progress Note    Elsa Song is a 26 y.o. female  at 41w4d  The patient was seen and examined. Her pain is well controlled with epidural. She reports fetal movement is present, complains of contractions, complains of loss of fluid, denies vaginal bleeding. Patient reports increased rectal pressure.      Vital Signs:  Vitals:    23 2031 23 2100 23 2131 23 2201   BP: 137/67 124/61 (!) 141/81 110/73   Pulse: 80 83 79 85   Resp: 17 17 17 18   Temp:       TempSrc:       SpO2:  100% 100% 100%   Weight:       Height:             FHT:  135 , moderate variability, accelerations present, 5 minute prolonged decelerations prior to SVE with spontaneous return to baseline  Contractions: regular, every 2-3 minutes    Examination chaperoned by Hayley CORDOBA    Cervical Exam: 8-9 cm dilated, 90% effaced, 0 station  Pitocin: @ 6 mu/min > discontinued following prolonged deceleration    Membranes: Ruptured clear fluid at 0330  Scalp Electrode in place: absent  Intrauterine Pressure Catheter in Place: absent    Interventions: none    Assessment/Plan:  Elsa Marin is a 26 y.o. female  at 41w4d IUP   - GBS bacteriuria / Rh positive / R immune   - Ancef for GBS prophylaxis    Spontaneous Labor   - VSS, afebrile   - cEFM and TOCO   - IVF:  mL/hr   - SROM clear fluid at 0330   - S/p FSE/IUPC   - Epidural   - Pitocin off   - Anticipate       Senior resident updated and in agreement with plan    DO BECKY Monzon Resident  2023, 10:21 PM
CLINICAL PHARMACY NOTE: MEDS TO BEDS    Total # of Prescriptions Filled: 2   The following medications were delivered to the patient:  Ibuprofen 800mg  Senexon-S    Additional Documentation:  Delivered to patient at 11:16am. No copay.  HR
Labor Progress Note    Elsa Pro is a 26 y.o. female  at 41w4d  The patient was seen and examined. Her pain is well controlled with epidural. She reports fetal movement is present, complains of contractions, complains of loss of fluid, denies vaginal bleeding. Prolonged deceleration noted, patient given fluid bolus and repositioned multiple times. Fetus tolerating hands and knees positioning. SVE performed and patient is 4/80/-1. IUPC placed, however displaced with hands and knees positioning. FHT recovers in hands and knees positioning, currently Cat 1. Will continue to monitor closely. Senior resident present for deceleration. Vital Signs:  Vitals:    23 1117 23 1118 23 1130 23 1200   BP:  111/60 113/78 123/63   Pulse: 84 79 (!) 106 89   Resp:   18 18   Temp:    98.4 °F (36.9 °C)   TempSrc:    Oral   SpO2:  98% 100% 100%   Weight:       Height:         FHT:  120 , moderate variability, accelerations present, one approximately 10 minute prolonged deceleration from 120s to 70s  Contractions: irregular, every 2-6 minutes    Chaperone for Intimate Exam: Chaperone was present for entire exam, Chaperone Name: Deven WORKMAN  Cervical Exam: 4 cm dilated, 80 effaced, -1 station  Pitocin: @ 0 mu/min    Membranes: Ruptured clear fluid  Scalp Electrode in place: absent  Intrauterine Pressure Catheter in Place: absent    Interventions: SVE, maternal repositioning, fluid bolus, IUPC placement    Assessment/Plan:  Elsa Marin is a 26 y.o. female  at 41w4d admitted for spontaneous labor   - GBS bacteruria, Ancef for GBS prophylaxis given PCN allergy   - VSS, afebrile   - cEFM/TOCO   - SROM (clr) @ 0330   - S/p IUPC   - Continue with expectant management.  Consider augmentation with pitocin once FHT continues to be be Cat 1 and no cervical change    Attending updated and in agreement with plan    Joe Dudley MD  Ob/Gyn Resident  2023, 11:32 AM
Labor Progress Note    Elsa Song is a 26 y.o. female  at 41w4d  The patient was seen and examined. Her pain is well controlled. She reports fetal movement is present, complains of contractions,  complains of  loss of fluid, denies vaginal bleeding. Patient more comfortable after epidural replacement. SVE performed.     Vital Signs:  Vitals:    23 2331 23 2336 23 2341 23 2346   BP: 104/64 115/65 104/66 (!) 100/59   Pulse: (!) 115 97 (!) 114 (!) 105   Resp: 17 17 17 16   Temp:       TempSrc:       SpO2:  100%  100%   Weight:       Height:            FHT: 140, moderate variability, accelerations present, some variable and early decelerations  Contractions: regular, every 1-4 minutes    Chaperone for Intimate Exam: Chaperone was present for entire exam, Chaperone Name: Kunal Henriquez RN  Cervical Exam: anterior lip  Pitocin: @ 0 mu/min    Membranes: Ruptured clear fluid  Scalp Electrode in place: absent  Intrauterine Pressure Catheter in Place: absent    Interventions: SVE performed    Assessment/Plan:  Elsa Marin is a 26 y.o. female  at 41w4d admitted for augmentation 2/2 SROM and spontaneous labor   - GBS positive, Ancef for GBS prophylaxis   - VSS, afebrile with isolated elevated BP   - cEFM/TOCO   - S/p IUPC/FSE   - Patient comfortable with epidural   - Pit currently off due to intermittent Cat II tracing, will restart when able, continue intrauterine resuscitation    Attending updated and in agreement with plan    Remberto Campos DO  Ob/Gyn Resident  2023, 11:51 PM
OBANANDN Labor Progress Note    Elsa Newell is a 26 y.o. female  at 41w4d  The patient was seen and examined. Her pain is well controlled with epidural. She reports fetal movement is present, complains of contractions, complains of loss of fluid, denies vaginal bleeding.        Vital Signs:  Vitals:    23 1830 23 1900 23 1930 23   BP: 112/78 110/64 103/62 104/66   Pulse: (!) 102 73 72 92   Resp: 18 18 17 17   Temp:   98.8 °F (37.1 °C)    TempSrc:   Oral    SpO2: 100% 100% 100% 100%   Weight:       Height:             FHT:  135 , moderate variability, accelerations present, 4 minute prolonged decelerations after SVE with spontaneous return to baseline  Contractions: regular, every 2-3 minutes    Examination chaperoned by Akanksha Rodriguez RN    Cervical Exam: 5 cm dilated, 80% effaced, 0 station  Pitocin: @ 4 mu/min    Membranes: Ruptured clear fluid  Scalp Electrode in place: absent  Intrauterine Pressure Catheter in Place: absent    Interventions: none    Assessment/Plan:  Elsa Marin is a 26 y.o. female  at 41w4d IUP   - GBS bacteriuria / Rh positive / R immune   - Ancef for GBS prophylaxis    Spontaneous Labor   - VSS   - cEFM and TOCO   - IVF:  mL/hr   - SROM clear fluid at 0330   - S/p FSE/IUPC   - Epidural   - Pitocin    - Continue expectant management      Senior resident updated and in agreement with plan    Ricardo Vaz DO  OBTUNDE Resident  2023, 8:52 PM
Obstetric/Gynecology Resident Interval Note    Fetal heart tracing notable for prolonged deceleration from 1313 to 1315 from baseline 130 bpm with caden to 80 bpm. Patient re-positioned onto left side with return to baseline 130 bpm. Moderate variability remains. Fetal status reassuring at this time. SVE performed and 5/70/0. Discussed the above with patient. Will continue to monitor closely.     Ivis Oseguera DO  OB/GYN Resident, PGY2  [facility] [place]  4/6/2023, 1:27 PM
Obstetric/Gynecology Resident Interval Note    Fetal heart tracing notable for prolonged deceleration from 1449 to 1456 with caden to 70 bpm. Difficult to fully assess duration of deceleration due to broken tracing but appears to be up to six minutes. Writer to patient bedside. RN re-positioning patient on to left side, IV fluid bolus running. SVE remains unchanged at 5/70/0. Due to difficulty obtaining fetal heart tones with EFM, decision made to place FSE. FSE placed without difficulty. Fetal heart tones back to baseline at 120 bpm with moderate variability and accelerations present. Discussed the above with patient and concern for fetal intolerance of labor. Risks, benefits and alternatives of continuing with labor vs proceeding with a  section were discussed extensively with the patient. Patient aware of risks of bleeding, infection, scarring, injury to infant, injury to surrounding pelvic tissues/organs, need for blood/blood products, need for additional surgery including hysterectomy, as well are rare risk for cardiac arrest, thromboembolic event, pneumonia, and maternal or fetal death. All questions were answered. Reassurance given. Consent for  section signed and placed in chart. Patient desires to continue with labor at this time. Discussed recommendation that if fetus has more prolonged decelerations or shows other signs of distress, recommend proceeding with  section. Patient verbalized understanding and agreeable with plan of care. Will continue to monitor closely.       Radha Rosario DO  OB/GYN Resident, PGY2  [facility], [address] [place] [address]   2023, 3:16 PM
feeding   - Counseled on s/sx of mastitis  3rd degree laceration   - S/p x1 dose of Ancef for OASIS   - Senokot/MoM ordered for bowel regimen   Limited PNC   - SW consult w/ no concerns  Continue post partum care  BMI 25    Counseling Completed:  Secondary Smoke risks and Sudden Infant Death Syndrome were reviewed with recommendations. Infant sleeping, \"back to sleep\" and avoidance of co-sleeping recommendations were reviewed. Signs and Symptoms of Post Partum Depression were reviewed. The patient is to call if any occur. Signs and symptoms of Mastitis were reviewed. The patient is to call if any occur for follow up. Discharge instructions including pelvic rest, no driving with pain medicine and office follow-up were reviewed with patient     Attending Physician: Dr. Doreen Daily MD  Ob/Gyn Resident   4/8/2023, 7:15 AM          Attending Physician Statement  Pt doing well and would like to go home today. I have discussed the care of Elsa Marin, including pertinent history and exam findings,  with the resident. I have reviewed the key elements of all parts of the encounter with the resident. I agree with the assessment, plan and orders as documented by the resident.   (Evi Iqbal)    Nba Camarillo MD

## 2023-04-11 LAB — SURGICAL PATHOLOGY REPORT: NORMAL

## 2024-04-04 ENCOUNTER — HOSPITAL ENCOUNTER (INPATIENT)
Age: 34
LOS: 2 days | Discharge: HOME | DRG: 560 | End: 2024-04-06
Attending: OBSTETRICS & GYNECOLOGY | Admitting: OBSTETRICS & GYNECOLOGY
Payer: COMMERCIAL

## 2024-04-04 ENCOUNTER — ANESTHESIA (OUTPATIENT)
Dept: LABOR AND DELIVERY | Age: 34
End: 2024-04-04
Payer: COMMERCIAL

## 2024-04-04 ENCOUNTER — ANESTHESIA EVENT (OUTPATIENT)
Dept: LABOR AND DELIVERY | Age: 34
End: 2024-04-04
Payer: COMMERCIAL

## 2024-04-04 PROBLEM — O09.93 HIGH-RISK PREGNANCY IN THIRD TRIMESTER: Status: ACTIVE | Noted: 2024-04-04

## 2024-04-04 LAB
ABO + RH BLD: NORMAL
AMPHET UR QL SCN: NEGATIVE
ARM BAND NUMBER: NORMAL
BARBITURATES UR QL SCN: NEGATIVE
BASOPHILS # BLD: 0.03 K/UL (ref 0–0.2)
BASOPHILS NFR BLD: 0 % (ref 0–2)
BENZODIAZ UR QL: NEGATIVE
BLOOD BANK SAMPLE EXPIRATION: NORMAL
BLOOD GROUP ANTIBODIES SERPL: NEGATIVE
CANNABINOIDS UR QL SCN: NEGATIVE
COCAINE UR QL SCN: NEGATIVE
EOSINOPHIL # BLD: <0.03 K/UL (ref 0–0.44)
EOSINOPHILS RELATIVE PERCENT: 0 % (ref 1–4)
ERYTHROCYTE [DISTWIDTH] IN BLOOD BY AUTOMATED COUNT: 15.6 % (ref 11.8–14.4)
FENTANYL UR QL: NEGATIVE
HCT VFR BLD AUTO: 37.3 % (ref 36.3–47.1)
HGB BLD-MCNC: 12 G/DL (ref 11.9–15.1)
IMM GRANULOCYTES # BLD AUTO: 0.09 K/UL (ref 0–0.3)
IMM GRANULOCYTES NFR BLD: 1 %
LYMPHOCYTES NFR BLD: 1.5 K/UL (ref 1.1–3.7)
LYMPHOCYTES RELATIVE PERCENT: 10 % (ref 24–43)
MCH RBC QN AUTO: 27.8 PG (ref 25.2–33.5)
MCHC RBC AUTO-ENTMCNC: 32.2 G/DL (ref 28.4–34.8)
MCV RBC AUTO: 86.5 FL (ref 82.6–102.9)
METHADONE UR QL: NEGATIVE
MONOCYTES NFR BLD: 0.7 K/UL (ref 0.1–1.2)
MONOCYTES NFR BLD: 5 % (ref 3–12)
NEUTROPHILS NFR BLD: 84 % (ref 36–65)
NEUTS SEG NFR BLD: 12.77 K/UL (ref 1.5–8.1)
NRBC BLD-RTO: 0 PER 100 WBC
OPIATES UR QL SCN: NEGATIVE
OXYCODONE UR QL SCN: NEGATIVE
PCP UR QL SCN: NEGATIVE
PLATELET # BLD AUTO: 191 K/UL (ref 138–453)
PMV BLD AUTO: 11 FL (ref 8.1–13.5)
RBC # BLD AUTO: 4.31 M/UL (ref 3.95–5.11)
RBC # BLD: ABNORMAL 10*6/UL
T PALLIDUM AB SER QL IA: NONREACTIVE
TEST INFORMATION: NORMAL
WBC OTHER # BLD: 15.1 K/UL (ref 3.5–11.3)

## 2024-04-04 PROCEDURE — 10907ZC DRAINAGE OF AMNIOTIC FLUID, THERAPEUTIC FROM PRODUCTS OF CONCEPTION, VIA NATURAL OR ARTIFICIAL OPENING: ICD-10-PCS | Performed by: OBSTETRICS & GYNECOLOGY

## 2024-04-04 PROCEDURE — 86780 TREPONEMA PALLIDUM: CPT

## 2024-04-04 PROCEDURE — 7200000001 HC VAGINAL DELIVERY

## 2024-04-04 PROCEDURE — 86901 BLOOD TYPING SEROLOGIC RH(D): CPT

## 2024-04-04 PROCEDURE — 51701 INSERT BLADDER CATHETER: CPT

## 2024-04-04 PROCEDURE — 59409 OBSTETRICAL CARE: CPT | Performed by: OBSTETRICS & GYNECOLOGY

## 2024-04-04 PROCEDURE — 6360000002 HC RX W HCPCS: Performed by: NURSE ANESTHETIST, CERTIFIED REGISTERED

## 2024-04-04 PROCEDURE — 80307 DRUG TEST PRSMV CHEM ANLYZR: CPT

## 2024-04-04 PROCEDURE — 85025 COMPLETE CBC W/AUTO DIFF WBC: CPT

## 2024-04-04 PROCEDURE — 6360000002 HC RX W HCPCS: Performed by: STUDENT IN AN ORGANIZED HEALTH CARE EDUCATION/TRAINING PROGRAM

## 2024-04-04 PROCEDURE — 6360000002 HC RX W HCPCS: Performed by: ANESTHESIOLOGY

## 2024-04-04 PROCEDURE — 6370000000 HC RX 637 (ALT 250 FOR IP)

## 2024-04-04 PROCEDURE — 86850 RBC ANTIBODY SCREEN: CPT

## 2024-04-04 PROCEDURE — 88307 TISSUE EXAM BY PATHOLOGIST: CPT

## 2024-04-04 PROCEDURE — 2580000003 HC RX 258: Performed by: STUDENT IN AN ORGANIZED HEALTH CARE EDUCATION/TRAINING PROGRAM

## 2024-04-04 PROCEDURE — 6360000002 HC RX W HCPCS

## 2024-04-04 PROCEDURE — 86900 BLOOD TYPING SEROLOGIC ABO: CPT

## 2024-04-04 PROCEDURE — 0UQMXZZ REPAIR VULVA, EXTERNAL APPROACH: ICD-10-PCS | Performed by: OBSTETRICS & GYNECOLOGY

## 2024-04-04 PROCEDURE — 1220000000 HC SEMI PRIVATE OB R&B

## 2024-04-04 RX ORDER — IBUPROFEN 600 MG/1
600 TABLET ORAL EVERY 6 HOURS PRN
Status: DISCONTINUED | OUTPATIENT
Start: 2024-04-04 | End: 2024-04-06 | Stop reason: HOSPADM

## 2024-04-04 RX ORDER — SENNA AND DOCUSATE SODIUM 50; 8.6 MG/1; MG/1
1 TABLET, FILM COATED ORAL DAILY
Qty: 30 TABLET | Refills: 1 | Status: SHIPPED | OUTPATIENT
Start: 2024-04-04

## 2024-04-04 RX ORDER — SEVOFLURANE 250 ML/250ML
1 LIQUID RESPIRATORY (INHALATION) CONTINUOUS PRN
Status: DISCONTINUED | OUTPATIENT
Start: 2024-04-04 | End: 2024-04-06 | Stop reason: HOSPADM

## 2024-04-04 RX ORDER — ONDANSETRON 4 MG/1
4 TABLET, ORALLY DISINTEGRATING ORAL EVERY 6 HOURS PRN
Status: CANCELLED | OUTPATIENT
Start: 2024-04-04

## 2024-04-04 RX ORDER — SODIUM CHLORIDE 9 MG/ML
INJECTION, SOLUTION INTRAVENOUS PRN
Status: DISCONTINUED | OUTPATIENT
Start: 2024-04-04 | End: 2024-04-04 | Stop reason: HOSPADM

## 2024-04-04 RX ORDER — ACETAMINOPHEN 500 MG
1000 TABLET ORAL EVERY 6 HOURS PRN
Status: DISCONTINUED | OUTPATIENT
Start: 2024-04-04 | End: 2024-04-04 | Stop reason: HOSPADM

## 2024-04-04 RX ORDER — ACETAMINOPHEN 500 MG
1000 TABLET ORAL EVERY 6 HOURS PRN
Qty: 60 TABLET | Refills: 1 | Status: SHIPPED | OUTPATIENT
Start: 2024-04-04

## 2024-04-04 RX ORDER — SODIUM CHLORIDE, SODIUM LACTATE, POTASSIUM CHLORIDE, AND CALCIUM CHLORIDE .6; .31; .03; .02 G/100ML; G/100ML; G/100ML; G/100ML
1000 INJECTION, SOLUTION INTRAVENOUS PRN
Status: DISCONTINUED | OUTPATIENT
Start: 2024-04-04 | End: 2024-04-04 | Stop reason: HOSPADM

## 2024-04-04 RX ORDER — FENTANYL CITRATE 50 UG/ML
INJECTION, SOLUTION INTRAMUSCULAR; INTRAVENOUS PRN
Status: DISCONTINUED | OUTPATIENT
Start: 2024-04-04 | End: 2024-04-04 | Stop reason: SDUPTHER

## 2024-04-04 RX ORDER — ROPIVACAINE HYDROCHLORIDE 2 MG/ML
INJECTION, SOLUTION EPIDURAL; INFILTRATION; PERINEURAL
Status: COMPLETED
Start: 2024-04-04 | End: 2024-04-04

## 2024-04-04 RX ORDER — IBUPROFEN 600 MG/1
600 TABLET ORAL EVERY 6 HOURS PRN
Qty: 30 TABLET | Refills: 1 | Status: SHIPPED | OUTPATIENT
Start: 2024-04-04

## 2024-04-04 RX ORDER — NALOXONE HYDROCHLORIDE 0.4 MG/ML
INJECTION, SOLUTION INTRAMUSCULAR; INTRAVENOUS; SUBCUTANEOUS PRN
Status: DISCONTINUED | OUTPATIENT
Start: 2024-04-04 | End: 2024-04-04 | Stop reason: HOSPADM

## 2024-04-04 RX ORDER — NALBUPHINE HYDROCHLORIDE 10 MG/ML
5 INJECTION, SOLUTION INTRAMUSCULAR; INTRAVENOUS; SUBCUTANEOUS EVERY 4 HOURS PRN
Status: DISCONTINUED | OUTPATIENT
Start: 2024-04-04 | End: 2024-04-04 | Stop reason: HOSPADM

## 2024-04-04 RX ORDER — ONDANSETRON 2 MG/ML
4 INJECTION INTRAMUSCULAR; INTRAVENOUS EVERY 6 HOURS PRN
Status: CANCELLED | OUTPATIENT
Start: 2024-04-04

## 2024-04-04 RX ORDER — MISOPROSTOL 100 UG/1
400 TABLET ORAL PRN
Status: CANCELLED | OUTPATIENT
Start: 2024-04-04

## 2024-04-04 RX ORDER — SODIUM CHLORIDE 0.9 % (FLUSH) 0.9 %
5-40 SYRINGE (ML) INJECTION PRN
Status: DISCONTINUED | OUTPATIENT
Start: 2024-04-04 | End: 2024-04-04 | Stop reason: HOSPADM

## 2024-04-04 RX ORDER — DOCUSATE SODIUM 100 MG/1
100 CAPSULE, LIQUID FILLED ORAL 2 TIMES DAILY
Status: DISCONTINUED | OUTPATIENT
Start: 2024-04-04 | End: 2024-04-04 | Stop reason: HOSPADM

## 2024-04-04 RX ORDER — ONDANSETRON 2 MG/ML
4 INJECTION INTRAMUSCULAR; INTRAVENOUS EVERY 6 HOURS PRN
Status: DISCONTINUED | OUTPATIENT
Start: 2024-04-04 | End: 2024-04-04 | Stop reason: HOSPADM

## 2024-04-04 RX ORDER — FENTANYL CITRATE 50 UG/ML
INJECTION, SOLUTION INTRAMUSCULAR; INTRAVENOUS
Status: COMPLETED
Start: 2024-04-04 | End: 2024-04-04

## 2024-04-04 RX ORDER — TERBUTALINE SULFATE 1 MG/ML
0.25 INJECTION, SOLUTION SUBCUTANEOUS
Status: DISCONTINUED | OUTPATIENT
Start: 2024-04-04 | End: 2024-04-04

## 2024-04-04 RX ORDER — SODIUM CHLORIDE 0.9 % (FLUSH) 0.9 %
5-40 SYRINGE (ML) INJECTION EVERY 12 HOURS SCHEDULED
Status: DISCONTINUED | OUTPATIENT
Start: 2024-04-04 | End: 2024-04-04 | Stop reason: HOSPADM

## 2024-04-04 RX ORDER — SODIUM CHLORIDE, SODIUM LACTATE, POTASSIUM CHLORIDE, AND CALCIUM CHLORIDE .6; .31; .03; .02 G/100ML; G/100ML; G/100ML; G/100ML
500 INJECTION, SOLUTION INTRAVENOUS PRN
Status: DISCONTINUED | OUTPATIENT
Start: 2024-04-04 | End: 2024-04-04 | Stop reason: HOSPADM

## 2024-04-04 RX ORDER — SODIUM CHLORIDE, SODIUM LACTATE, POTASSIUM CHLORIDE, CALCIUM CHLORIDE 600; 310; 30; 20 MG/100ML; MG/100ML; MG/100ML; MG/100ML
INJECTION, SOLUTION INTRAVENOUS CONTINUOUS
Status: DISCONTINUED | OUTPATIENT
Start: 2024-04-04 | End: 2024-04-04 | Stop reason: HOSPADM

## 2024-04-04 RX ADMIN — Medication 166.7 ML: at 19:58

## 2024-04-04 RX ADMIN — SODIUM CHLORIDE, POTASSIUM CHLORIDE, SODIUM LACTATE AND CALCIUM CHLORIDE 1000 ML: 600; 310; 30; 20 INJECTION, SOLUTION INTRAVENOUS at 14:44

## 2024-04-04 RX ADMIN — IBUPROFEN 600 MG: 600 TABLET, FILM COATED ORAL at 22:51

## 2024-04-04 RX ADMIN — Medication 2000 MG: at 14:39

## 2024-04-04 RX ADMIN — ROPIVACAINE HYDROCHLORIDE 10 ML/HR: 2 INJECTION, SOLUTION EPIDURAL; INFILTRATION at 15:07

## 2024-04-04 RX ADMIN — SODIUM CHLORIDE, POTASSIUM CHLORIDE, SODIUM LACTATE AND CALCIUM CHLORIDE 500 ML: 600; 310; 30; 20 INJECTION, SOLUTION INTRAVENOUS at 15:37

## 2024-04-04 RX ADMIN — FENTANYL CITRATE 20 MCG: 50 INJECTION, SOLUTION INTRAMUSCULAR; INTRAVENOUS at 15:03

## 2024-04-04 RX ADMIN — SODIUM CHLORIDE, POTASSIUM CHLORIDE, SODIUM LACTATE AND CALCIUM CHLORIDE: 600; 310; 30; 20 INJECTION, SOLUTION INTRAVENOUS at 18:54

## 2024-04-04 ASSESSMENT — PAIN DESCRIPTION - DESCRIPTORS: DESCRIPTORS: CRAMPING

## 2024-04-04 ASSESSMENT — PAIN SCALES - GENERAL
PAINLEVEL_OUTOF10: 9
PAINLEVEL_OUTOF10: 1

## 2024-04-04 NOTE — DISCHARGE SUMMARY
Obstetric Discharge Summary  [facility]    Patient Name: Elsa Marin  Patient : 1990  Primary Care Physician: Emilie Anne DO  Admit Date: 2024    Principal Diagnosis: IUP at Unknown, admitted for spontaneous labor     Her pregnancy has been complicated by:   Patient Active Problem List   Diagnosis    Insufficient prenatal care    Genital herpes    RUQ pain    40 weeks gestation of pregnancy    Hx Third degree laceration     23 M Apg 8/9 Wt 8#7    High-risk pregnancy in third trimester     24 M Apg 8/9 Wt 7#9       Infection Present?: No  Hospital Acquired: N/A    Surgical Operations & Procedures:  Analgesia: epidural  Delivery Type: Spontaneous Vaginal Delivery: See Labor and Delivery Summary   Laceration(s): Right periurethral, 2nd degree midline repaired with 3-0 vicryl    Consultations: Anesthesia    Pertinent Findings & Procedures:   Elsa Marin is a 34 y.o. female  at approximately 41w0d admitted for spontaneous labor; received Ancef, epidural, AROM.     She delivered by spontaneous vaginal a Live Born infant on 24.       Information for the patient's :  Matias Marin [[id number]]   male   Birth Weight: 3.45 kg (7 lb 9.7 oz)     Apgars: 8 at 1 minute and 9 at 5 minutes.     Postpartum course: normal.      Course of patient: uncomplicated    Discharge to: Home    Readmission planned: no     Indication for 6 week PP 2 hour GTT?: no     Eligible for 2 week PP virtual visit? Yes    Recommendations on Discharge:     Medications:      Medication List        START taking these medications      * ibuprofen 600 MG tablet  Commonly known as: ADVIL;MOTRIN  Take 1 tablet by mouth every 6 hours as needed for Pain     polyethylene glycol 17 g packet  Commonly known as: GLYCOLAX  Take 1 packet by mouth daily           * This list has 1 medication(s) that are the same as other medications prescribed for you. Read the directions

## 2024-04-04 NOTE — FLOWSHEET NOTE
From home per ambulance to 704.  Very uncomfortable.  Supported in efforts and plan of care discussed.  Wanting to go up to the bathroom to void.  Specimen cup given and instructed.  Talking between contractions, uncomfortable and moaning with contractions.  Dr. Troncoso at beside.  IV LR infusing in right forearm near antecubital with an 18G cathlon, placed per EMS.    In the bathroom awhile, stated had diarrhea. Back to bed with EFM applied and portable ultrasound showing vertex presentation. SVE per Dr. Troncoso.  Requesting pain medication or epidural.  Instructed on need for labs.  IV Bolus started.  Encouraged breathing and relaxation.

## 2024-04-04 NOTE — H&P
OBSTETRICAL HISTORY AND PHYSICAL  [facility]    Date: 2024       Time: 6:23 PM   Patient Name: Elsa Marin     Patient : 1990  Room/Bed: 0704/0704-01    Admission Date/Time: 2024  1:50 PM      CC: contractions     HPI: Elsa Marin is a 34 y.o.  at approximately \"40 weeks\" gestation who presents with contractions since yesterday. She reports they have gotten stronger and closer together today and are taking her breath away. She reports she gets her prenatal care in [place], but has not had a formal anatomy ultrasound this pregnancy. She denies a history of HTN or DM. She takes no medications. The patient reports fetal movement is present, complains of contractions, denies loss of fluid, denies vaginal bleeding. Patient denies headache, vision changes, nausea, vomiting, fever, chills, shortness of breath, chest pain, RUQ pain, abdominal pain, diarrhea, change in color/amount/odor of vaginal discharge, dysuria or, hematuria.       DATING:  LMP: No LMP recorded. Patient is pregnant.  Estimated Date of Delivery: None noted.   Based on: Unknown, no dating ultrasound, patient does not remember LMP    PREGNANCY RISK FACTORS:  Patient Active Problem List   Diagnosis    Insufficient prenatal care    Genital herpes    RUQ pain    40 weeks gestation of pregnancy    Third degree laceration      23 M Apg 8/9 Wt 8#7    High-risk pregnancy in third trimester        Steroids Given In This Pregnancy:  no     REVIEW OF SYSTEMS:   Constitutional: negative fever, negative chills, negative weight changes   HEENT: negative visual disturbances, negative headaches, negative dizziness, negative hearing loss  Breast: Negative breast abnormalities, negative breast lumps, negative nipple discharge  Respiratory: negative dyspnea, negative cough, negative SOB  Cardiovascular: negative chest pain,  negative palpitations, negative arrhythmia, negative syncope

## 2024-04-04 NOTE — CARE COORDINATION
ANTEPARTUM NOTE    High-risk pregnancy in third trimester [O09.93]    Elsa was admitted to L&D on 4/4/2024 for contractions @ \"approximately 40 weeks\" per notes.     OB GYN Provider: IMELDA Nino CNM (per patient)    Will meet with patient after delivery to verify name/address/phone/insurance and discuss discharge planning.     Anticipate DC home 2 nights after vaginal delivery or 4 nights after C/S delivery as long as hemodynamically stable.

## 2024-04-04 NOTE — ANESTHESIA PRE PROCEDURE
03/17/23 66.2 kg (146 lb)   12/24/22 56.7 kg (125 lb)     There is no height or weight on file to calculate BMI.    CBC:   Lab Results   Component Value Date/Time    WBC 15.1 04/04/2024 02:16 PM    RBC 4.31 04/04/2024 02:16 PM    HGB 12.0 04/04/2024 02:16 PM    HCT 37.3 04/04/2024 02:16 PM    MCV 86.5 04/04/2024 02:16 PM    RDW 15.6 04/04/2024 02:16 PM     04/04/2024 02:16 PM       CMP:   Lab Results   Component Value Date/Time     03/18/2023 02:19 AM    K 3.4 03/18/2023 02:19 AM     03/18/2023 02:19 AM    CO2 21 03/18/2023 02:19 AM    BUN 9 03/18/2023 02:19 AM    CREATININE 0.64 03/18/2023 02:19 AM    GFRAA >60 02/03/2022 03:13 AM    AGRATIO 1.0 03/18/2023 02:19 AM    LABGLOM >60 03/18/2023 02:19 AM    GLUCOSE 94 03/18/2023 02:19 AM    PROT 7.1 03/18/2023 02:19 AM    CALCIUM 9.0 03/18/2023 02:19 AM    BILITOT 0.4 03/18/2023 02:19 AM    ALKPHOS 141 03/18/2023 02:19 AM    AST 17 03/18/2023 02:19 AM    ALT 11 03/18/2023 02:19 AM       POC Tests: No results for input(s): \"POCGLU\", \"POCNA\", \"POCK\", \"POCCL\", \"POCBUN\", \"POCHEMO\", \"POCHCT\" in the last 72 hours.    Coags: No results found for: \"PROTIME\", \"INR\", \"APTT\"    HCG (If Applicable): No results found for: \"PREGTESTUR\", \"PREGSERUM\", \"HCG\", \"HCGQUANT\"     ABGs: No results found for: \"PHART\", \"PO2ART\", \"WBI7GGA\", \"FOL3FUN\", \"BEART\", \"T0DNQJYR\"     Type & Screen (If Applicable):  No results found for: \"LABABO\", \"LABRH\"    Drug/Infectious Status (If Applicable):  No results found for: \"HIV\", \"HEPCAB\"    COVID-19 Screening (If Applicable): No results found for: \"COVID19\"        Anesthesia Evaluation  Patient summary reviewed and Nursing notes reviewed  Airway: Mallampati: II  TM distance: >3 FB   Neck ROM: full  Mouth opening: > = 3 FB   Dental: normal exam         Pulmonary:normal exam                               Cardiovascular:  Exercise tolerance: good (>4 METS)          Rhythm: regular  Rate: normal                    Neuro/Psych:   (+)

## 2024-04-04 NOTE — ANESTHESIA PROCEDURE NOTES
CSE Block    Patient location during procedure: OB  Start time: 4/4/2024 3:03 PM  Reason for block: labor epidural  Staffing  Performed: resident/CRNA   Resident/CRNA: Junior Baez APRN - CRNA  Performed by: Junior Baez APRN - CRNA  Authorized by: Esther Taylor MD    CSE  Patient position: sitting  Prep: ChloraPrep  Patient monitoring: frequent blood pressure checks and continuous pulse ox  Approach: midline  Guidance: paresthesia technique  Provider prep: mask and sterile gloves  Spinal Needle  Needle type: pencil-tip   Needle gauge: 25 G  Needle length: 3.5 in  Needle insertion depth: 6 cm  Epidural Needle  Injection technique: OCTAVIO saline  Needle type: Tuohy   Needle gauge: 17 G  Needle length: 3.5 in  Needle insertion depth: 5 cm  Location: lumbar (1-5)  Catheter  Catheter type: end hole  Catheter size: 18 G  Catheter at skin depth: 12 cm  Test dose: negative  AssessmentT6  Hemodynamics: stable

## 2024-04-04 NOTE — FLOWSHEET NOTE
To semi-fowlers after epidural placement.  Tolerated well.  Feeling better already. Instructed on eventual position changes.

## 2024-04-05 ENCOUNTER — ANESTHESIA (OUTPATIENT)
Dept: POSTPARTUM | Age: 34
DRG: 560 | End: 2024-04-05
Payer: COMMERCIAL

## 2024-04-05 ENCOUNTER — ANESTHESIA EVENT (OUTPATIENT)
Dept: POSTPARTUM | Age: 34
DRG: 560 | End: 2024-04-05
Payer: COMMERCIAL

## 2024-04-05 PROCEDURE — 6370000000 HC RX 637 (ALT 250 FOR IP): Performed by: OBSTETRICS & GYNECOLOGY

## 2024-04-05 PROCEDURE — 6370000000 HC RX 637 (ALT 250 FOR IP)

## 2024-04-05 PROCEDURE — 2580000003 HC RX 258

## 2024-04-05 PROCEDURE — 1220000000 HC SEMI PRIVATE OB R&B

## 2024-04-05 RX ORDER — SENNA AND DOCUSATE SODIUM 50; 8.6 MG/1; MG/1
2 TABLET, FILM COATED ORAL NIGHTLY
Status: DISCONTINUED | OUTPATIENT
Start: 2024-04-05 | End: 2024-04-06 | Stop reason: HOSPADM

## 2024-04-05 RX ORDER — BISACODYL 10 MG
10 SUPPOSITORY, RECTAL RECTAL DAILY PRN
Status: DISCONTINUED | OUTPATIENT
Start: 2024-04-05 | End: 2024-04-06 | Stop reason: HOSPADM

## 2024-04-05 RX ORDER — SODIUM CHLORIDE 0.9 % (FLUSH) 0.9 %
5-40 SYRINGE (ML) INJECTION EVERY 12 HOURS SCHEDULED
Status: DISCONTINUED | OUTPATIENT
Start: 2024-04-05 | End: 2024-04-06 | Stop reason: HOSPADM

## 2024-04-05 RX ORDER — LANOLIN 72 %
OINTMENT (GRAM) TOPICAL PRN
Status: DISCONTINUED | OUTPATIENT
Start: 2024-04-05 | End: 2024-04-06 | Stop reason: HOSPADM

## 2024-04-05 RX ORDER — ONDANSETRON 2 MG/ML
4 INJECTION INTRAMUSCULAR; INTRAVENOUS EVERY 4 HOURS PRN
Status: DISCONTINUED | OUTPATIENT
Start: 2024-04-05 | End: 2024-04-06 | Stop reason: HOSPADM

## 2024-04-05 RX ORDER — SODIUM CHLORIDE 9 MG/ML
INJECTION, SOLUTION INTRAVENOUS PRN
Status: DISCONTINUED | OUTPATIENT
Start: 2024-04-05 | End: 2024-04-06 | Stop reason: HOSPADM

## 2024-04-05 RX ORDER — POLYETHYLENE GLYCOL 3350 17 G/17G
17 POWDER, FOR SOLUTION ORAL DAILY
Qty: 30 PACKET | Refills: 0 | Status: SHIPPED | OUTPATIENT
Start: 2024-04-05 | End: 2024-05-05

## 2024-04-05 RX ORDER — POLYETHYLENE GLYCOL 3350 17 G/17G
17 POWDER, FOR SOLUTION ORAL DAILY
Status: DISCONTINUED | OUTPATIENT
Start: 2024-04-05 | End: 2024-04-06 | Stop reason: HOSPADM

## 2024-04-05 RX ORDER — SIMETHICONE 80 MG
80 TABLET,CHEWABLE ORAL EVERY 6 HOURS PRN
Status: DISCONTINUED | OUTPATIENT
Start: 2024-04-05 | End: 2024-04-06 | Stop reason: HOSPADM

## 2024-04-05 RX ORDER — SODIUM CHLORIDE 0.9 % (FLUSH) 0.9 %
5-40 SYRINGE (ML) INJECTION PRN
Status: DISCONTINUED | OUTPATIENT
Start: 2024-04-05 | End: 2024-04-06 | Stop reason: HOSPADM

## 2024-04-05 RX ORDER — ACETAMINOPHEN 500 MG
1000 TABLET ORAL EVERY 6 HOURS PRN
Status: DISCONTINUED | OUTPATIENT
Start: 2024-04-05 | End: 2024-04-06 | Stop reason: HOSPADM

## 2024-04-05 RX ADMIN — ACETAMINOPHEN 1000 MG: 500 TABLET ORAL at 16:26

## 2024-04-05 RX ADMIN — SODIUM CHLORIDE, PRESERVATIVE FREE 10 ML: 5 INJECTION INTRAVENOUS at 22:45

## 2024-04-05 RX ADMIN — IBUPROFEN 600 MG: 600 TABLET, FILM COATED ORAL at 08:35

## 2024-04-05 RX ADMIN — ACETAMINOPHEN 1000 MG: 500 TABLET ORAL at 00:51

## 2024-04-05 RX ADMIN — DOCUSATE SODIUM 50 MG AND SENNOSIDES 8.6 MG 2 TABLET: 8.6; 5 TABLET, FILM COATED ORAL at 00:51

## 2024-04-05 RX ADMIN — ACETAMINOPHEN 1000 MG: 500 TABLET ORAL at 06:39

## 2024-04-05 RX ADMIN — DOCUSATE SODIUM 50 MG AND SENNOSIDES 8.6 MG 2 TABLET: 8.6; 5 TABLET, FILM COATED ORAL at 22:45

## 2024-04-05 RX ADMIN — IBUPROFEN 600 MG: 600 TABLET, FILM COATED ORAL at 16:25

## 2024-04-05 RX ADMIN — IBUPROFEN 600 MG: 600 TABLET, FILM COATED ORAL at 22:45

## 2024-04-05 RX ADMIN — IBUPROFEN 600 MG: 600 TABLET, FILM COATED ORAL at 05:16

## 2024-04-05 RX ADMIN — ACETAMINOPHEN 1000 MG: 500 TABLET ORAL at 22:45

## 2024-04-05 RX ADMIN — POLYETHYLENE GLYCOL 3350 17 G: 17 POWDER, FOR SOLUTION ORAL at 17:54

## 2024-04-05 RX ADMIN — BENZOCAINE AND LEVOMENTHOL: 200; 5 SPRAY TOPICAL at 00:52

## 2024-04-05 ASSESSMENT — PAIN DESCRIPTION - DESCRIPTORS
DESCRIPTORS: SORE;ACHING
DESCRIPTORS: SORE;DISCOMFORT
DESCRIPTORS: DISCOMFORT;SORE
DESCRIPTORS: SORE

## 2024-04-05 ASSESSMENT — PAIN DESCRIPTION - ORIENTATION
ORIENTATION: LOWER

## 2024-04-05 ASSESSMENT — PAIN SCALES - GENERAL
PAINLEVEL_OUTOF10: 9
PAINLEVEL_OUTOF10: 10
PAINLEVEL_OUTOF10: 5
PAINLEVEL_OUTOF10: 1
PAINLEVEL_OUTOF10: 8
PAINLEVEL_OUTOF10: 5

## 2024-04-05 ASSESSMENT — PAIN DESCRIPTION - LOCATION
LOCATION: PERINEUM
LOCATION: PERINEUM
LOCATION: ABDOMEN;PERINEUM
LOCATION: PERINEUM

## 2024-04-05 ASSESSMENT — PAIN - FUNCTIONAL ASSESSMENT
PAIN_FUNCTIONAL_ASSESSMENT: ACTIVITIES ARE NOT PREVENTED
PAIN_FUNCTIONAL_ASSESSMENT: ACTIVITIES ARE NOT PREVENTED

## 2024-04-05 NOTE — PROGRESS NOTES
POST PARTUM DAY # 1     Elsa Marin is a 34 y.o. female  This patient was seen & examined today.     Her pregnancy was complicated by:   Patient Active Problem List   Diagnosis    Insufficient prenatal care    Genital herpes    RUQ pain    40 weeks gestation of pregnancy    Third degree laceration      23 M Apg 8/9 Wt 8#7    High-risk pregnancy in third trimester     24 M Apg 8/9 Wt 7#9       Today she is doing well without any chief complaint. Her lochia is light. She denies chest pain, shortness of breath, headache, and blurred vision. She is  breast feeding and she denies any breast tenderness. She is ambulating well. Her voiding pattern is normal. I reviewed signs and symptoms of post partum depression with the patient, she currently denies any of these symptoms. She is tolerating solids.     Vital Signs:  Vitals:    24 2131 24 2146 24 2201 24 2310   BP: 115/68 113/61 118/66 105/66   Pulse: 79 81 85 69   Resp: 15 16 16 17   Temp:    98.4 °F (36.9 °C)   TempSrc:    Oral   SpO2:    99%       Physical Exam:  General:  no apparent distress, alert, and cooperative  Neurologic:  alert, oriented, normal speech, no focal findings or movement disorder noted  Lungs:  No increased work of breathing, good air exchange  Heart:  regular rate and rhythm    Abdomen: abdomen soft, non-distended, non-tender  Fundus: non-tender, normal size, firm, below umbilicus  Extremities:  no calf tenderness, non edematous    Lab:  Lab Results   Component Value Date    HGB 12.0 2024     Lab Results   Component Value Date    HCT 37.3 2024       Assessment/Plan:  Elsa Marin is a  PPD # 1 s/p    - Doing well, VSS   - male infant in General Care Nursery, circumcision desired   - Encourage ambulation   - Labs if sx   - Motrin/Tylenol for pain control    Rh positive/Rubella immune    Breast feeding    - Counseled on s/sx mastitis    No PNC   - SW consult PP    BMI

## 2024-04-05 NOTE — CONSULTS
INPATIENT CONSULT    Maternal /para status:     Maternal breastfeeding history: States she nursed last child for about two weeks       Current pregnancy:    Gestational age: [place] at 38 weeks     C/section or vaginal delivery: vaginal      Birth weight: 7.9lb (3450g)      SGA/LGA/IUGR/diabetes during pregnancy:      Plan for feeding: Breast/pumping      Breast pump at home: Yes, used one from sister. Signed medical necessity form given and encouraged to call wic for new pump        Assessment of breastfeeding: Has attempted and also giving formula due to low blood sugars. Pt states she would like help with positioning, encouraged to call out for assistance when baby arouses           Reviewed:   - Breastfeeding packet  - Expectations for normal  feeding   - Hand expression  - Deep latch/milk transfer  - Cues for feeding (early/late)     Encouraged:   - Frequent skin to skin with mom or dad  - Frequent attempts to feed  - Calling for assistance as needed

## 2024-04-05 NOTE — ANESTHESIA POSTPROCEDURE EVALUATION
Department of Anesthesiology  Postprocedure Note    Patient: Elsa Marin  MRN: [id number]  YOB: 1990  Date of evaluation: 4/5/2024    Procedure Summary       Date: 04/05/24 Room / Location:     Anesthesia Start:  Anesthesia Stop:     Procedure: Labor Analgesia Diagnosis:     Scheduled Providers:  Responsible Provider:     Anesthesia Type: Not recorded ASA Status: Not recorded            Anesthesia Type: No value filed.    Jenni Phase I: Jenni Score: 9    Jenni Phase II: Jenni Score: 10    Anesthesia Post Evaluation    Patient location during evaluation: floor  Patient participation: complete - patient participated  Level of consciousness: awake  Airway patency: patent  Nausea & Vomiting: no nausea and no vomiting  Cardiovascular status: blood pressure returned to baseline  Respiratory status: acceptable  Hydration status: euvolemic  Comments: Adequate recovery from labor epidural.  No residual effects.    No notable events documented.

## 2024-04-05 NOTE — FLOWSHEET NOTE
Patient admitted to room 733 from l&d via .   Oriented to room and surroundings.  Plan of care reviewed.  Verbalized understanding.  Instructed on infant security and safe sleep practices.  Preventing falls education provided .The following handouts given: A New Beginning: Your Guide to Postpartum Care, Rounding, gs Security System,Babies Cry A lot, Safe Sleep, Security and Visitation Guidelines.   Call light placed within reach.

## 2024-04-05 NOTE — L&D DELIVERY NOTE
Matias Marin [[id number]]      Labor Events      Cervical Ripening Date/Time:        Rupture Date/Time:      Rupture Type: Bulging  Fluid Color: Bloody Show              Anesthesia    Method: Spinal       Labor Event Times      Labor onset date/time:        Dilation complete date/time:  24 19:45:00 EDT     Start pushing date/time:     Decision date/time (emergent ):            Delivery Details      Delivery Date: 24 Delivery Time: 19:56:00   Delivery Type: Vaginal, Spontaneous              [place] Presentation    Presentation: Vertex  Position: Right  _: Occiput  _: Anterior       Shoulder Dystocia    Shoulder Dystocia Present?: No       Assisted Delivery Details    Forceps Attempted?: No  Vacuum Extractor Attempted?: No                           Cord    Vessels: 3 Vessels  Complications: None  Cord Around: Head  Delayed Cord Clamping?: Yes  Cord Clamped Date/Time: 2024 19:57:00  Cord Blood Disposition: Lab  Gases Sent?: Yes              Placenta    Date/Time: 2024 19:58:00  Removal: Spontaneous  Appearance: Intact  Disposition: Lab, Pathology       Lacerations    Episiotomy: None  Perineal Lacerations: 2nd  Other Lacerations: periurethral laceration  Periurethral Laceration: Right Repaired?: Yes          Vaginal Counts    Final Count Personnel: KEO  Final Count Verified By: SHELL  Accurate Final Count?: Yes       Blood Loss  Mother: Elsa Marin R #[id number]     Start of Mother's Information      Delivery Blood Loss  24 0756 - 24 2247      None                 End of Mother's Information  Mother: Saqib Marinica R #[id number]                Delivery Providers    Delivering clinician: Tiffany Asher MD     Provider Role    Tiffany Asher MD Obstetrician    Samantha Manzo RN Primary Nurse    Kerri Pacheco RN Primary [place] Nurse     NICU Nurse     Neonatologist     Anesthesiologist     Nurse Anesthetist     Nurse Practitioner     Midwife      Nursery Nurse    Elizabeth Dockery DO Resident    Jackelyn Walters DO Resident    Monica Cobb MD Resident    Smiley Salazar Medical Student    Jose Cabrera MD Resident               Assessment    Living Status: Living        Skin Color:   Heart Rate:   Reflex Irritability:   Muscle Tone:   Respiratory Effort:   Total:            1 Minute:    0    2    2    2    2    8         5 Minute:    1    2    2    2    2    9                                        Apgars Assigned By: AMALDONADORN              Resuscitation    Method: Bulb Suction, Stimulation             [place] Measurements      Birth Weight: 3450 g   Birth Length: 49.5 cm              Skin to Skin      Skin to Skin With: Mother                  Vaginal Delivery Note  Department of Obstetrics and Gynecology  [facility]       Patient: Elsa Marin   : 1990  MRN: [id number]   Date of delivery: 24     Pre-operative Diagnosis: Elsa Marin  at Unknown  Spontaneous labor   Hx 3rd degree laceration  Hx HSV (SSE neg)   PNC allergy   No PNC   Human metapneumovirus   BMI 24     Post-operative Diagnosis:   living infant male    Delivering Obstetrician & Assistant(s): Dr. Lakeshia MD; Elizabeth Dockery DO PGY4 Jackelyn Walters DO PGY1    Infant Information:   Information for the patient's :  Matias Marin Elsa [[id number]]      Information for the patient's :  MaribelMatias gomez [[id number]]        Apgar scores: 8 at 1 minute and 9 at 5 minutes.     Anesthesia:  epidural anesthesia    Application and Delivery:    She was known to be GBS unknown with no prenatal care. Positive GBS urine culture in  and decision was made to treat with antibiotic prophylaxis.     The patient progressed well, received an epidural, became complete and felt the urge to push. After pushing with contractions the fetal head delivered Cephalic, right occiput anterior over an intact perineum, nuchal cord was

## 2024-04-05 NOTE — FLOWSHEET NOTE
RN at bedside, fundus and bleeding assessed, see flow sheet. Pt up to bathroom with RN assist, use of jomar stedy due to pt left leg numb and heavy still, ainsley care done. Pt unable to void. Pt tolerated well. Pt transferred in wheelchair to  with baby in arms. Bedside report given to Ginger CORDOBA.

## 2024-04-05 NOTE — ANESTHESIA POSTPROCEDURE EVALUATION
Department of Anesthesiology  Postprocedure Note    Patient: Elsa aMrin  MRN: [id number]  YOB: 1990  Date of evaluation: 4/5/2024    Procedure Summary       Date: 04/04/24 Room / Location:     Anesthesia Start: 1500 Anesthesia Stop: 1956    Procedure: Labor Analgesia Diagnosis:     Scheduled Providers:  Responsible Provider: Esther Taylor MD    Anesthesia Type: CSE ASA Status: 2            Anesthesia Type: No value filed.    Jenni Phase I: Jenni Score: 9    Jenni Phase II: Jenni Score: 10    Anesthesia Post Evaluation    Patient location during evaluation: floor  Patient participation: complete - patient participated  Level of consciousness: awake  Airway patency: patent  Nausea & Vomiting: no nausea and no vomiting  Cardiovascular status: blood pressure returned to baseline  Respiratory status: acceptable  Hydration status: euvolemic  Comments: Adequate recovery from  Epidural for C/section.  No residual effects.  Multimodal analgesia pain management approach    No notable events documented.

## 2024-04-05 NOTE — CARE COORDINATION
Social Work     Sw reviewed medical record (current active problem list) and tox screens and see's mom did not obtain any pnc this pregnancy.    Sw familiar from mom at previous delivery, no history of concerns.       Sw spoke with mom briefly to explain Sw role, inquire if any needs or concerns, and provide safe sleep education and discuss.  Mom denied any needs or questions and informs baby has a safe sleep environment (pnp).     Mom denied any current s/s of anxiety or depression and is aware to reach out to OB if any s/s occur after dc (undecided if this will be [facility] or her previous OB dr).  Mom discussed no pnc and went in depth that fob (Dejuan- from [place], more old school as far as medical care concerned) traveled for fob's job where he is gone for 3 month periods working in construction/remodeling.  Mom and their almost 1 year old son traveled with him, mom states due to having [place] medicaid she was unable to obtain care in different states.  Mom states she and fob returned last week from [place].      Mom's parents local and a supportive.     Mom reports a good support system with family, she did discuss some relationship issues with fob that are stressors for her, but denied any current questions or needs.     Mom denied any safety issues or concerns with fob.      Mom discussed her previous delivery being traumatic (3rd degree tear, epidural wore off), mom reports she was very emotional and stressed for this delivery and set on having a .  Mom reports delivery came quickly and she was able to delivery vaginally with no issues.       Mom reports this is her 2nd child.       Mom states ped will be Dr. Tiffany Patel at [facility].      Mom plans to work with WIC and reports she resides in home with her children.      Sw encouraged mom to reach out if any issues or concerns arise.

## 2024-04-05 NOTE — CARE COORDINATION
CASE MANAGEMENT POST-PARTUM TRANSITIONAL CARE PLAN    High-risk pregnancy in third trimester [O09.93]    OB Provider: Meggan MANCIAM    Writer met w/ Elsa at her bedside to discuss DCP. She is S/P  on 2024    Writer verified address correct on facesheet. She has a new phone number:     [phone].      Corrected in EPIC.     She states she lives with her other child. She denied barriers with transportation home, to doctors appointments or with paying for medications upon discharge home.     [employer] insurance correct. Writer notified Elsa she has 30 days from date of birth to add  to insurance policy by contacting S.  She verbalized understanding.    Elsa confirmed a safe place for infant to sleep at home.    Infant name on BC: undecided.   Infant PCP Dr Patel.     DME: no  HOME CARE: no    Anticipated DC of mother and infant 1-2 days after .     Readmission Risk              Risk of Unplanned Readmission:  4

## 2024-04-06 VITALS
RESPIRATION RATE: 18 BRPM | OXYGEN SATURATION: 98 % | SYSTOLIC BLOOD PRESSURE: 108 MMHG | HEIGHT: 64 IN | WEIGHT: 136 LBS | TEMPERATURE: 98.2 F | BODY MASS INDEX: 23.22 KG/M2 | DIASTOLIC BLOOD PRESSURE: 61 MMHG | HEART RATE: 96 BPM

## 2024-04-06 PROCEDURE — 6370000000 HC RX 637 (ALT 250 FOR IP)

## 2024-04-06 RX ADMIN — ACETAMINOPHEN 1000 MG: 500 TABLET ORAL at 13:03

## 2024-04-06 RX ADMIN — IBUPROFEN 600 MG: 600 TABLET, FILM COATED ORAL at 05:11

## 2024-04-06 RX ADMIN — ACETAMINOPHEN 1000 MG: 500 TABLET ORAL at 05:11

## 2024-04-06 RX ADMIN — IBUPROFEN 600 MG: 600 TABLET, FILM COATED ORAL at 13:02

## 2024-04-06 ASSESSMENT — PAIN SCALES - GENERAL
PAINLEVEL_OUTOF10: 5
PAINLEVEL_OUTOF10: 6

## 2024-04-06 ASSESSMENT — PAIN DESCRIPTION - LOCATION: LOCATION: PERINEUM

## 2024-04-06 ASSESSMENT — PAIN DESCRIPTION - DESCRIPTORS: DESCRIPTORS: ACHING;SORE

## 2024-04-06 ASSESSMENT — PAIN - FUNCTIONAL ASSESSMENT: PAIN_FUNCTIONAL_ASSESSMENT: ACTIVITIES ARE NOT PREVENTED

## 2024-04-06 ASSESSMENT — PAIN DESCRIPTION - ORIENTATION: ORIENTATION: LOWER

## 2024-04-06 NOTE — LACTATION NOTE
Pt states she is giving baby formula due to painful/damaged nipples and baby clicking while feeding at breast. Reviewed deep latch and oral assessment completed on . Difficult to examine under the tongue due to baby keeping mouth clenched, but bubble palate noted. Strongly encouraged mother to call out next feed to assist with latching or check a latch after she has him feeding. Reviewed supply/demand of breastmilk and encouraged regular milk remova by a breast pump if she is not latching. Pt states she has a breast pump at home.

## 2024-04-06 NOTE — PROGRESS NOTES
POST PARTUM DAY # 2    Elsa Marin is a 34 y.o. female  This patient was seen & examined today.  on 24    Her pregnancy was complicated by:   Patient Active Problem List   Diagnosis    Insufficient prenatal care    Genital herpes    RUQ pain    40 weeks gestation of pregnancy    Hx Third degree laceration     23 M Apg 8/9 Wt 8#7    High-risk pregnancy in third trimester     24 M Apg 8/9 Wt 7#9       Today she is doing well without any chief complaint. Her lochia is light. She denies chest pain, shortness of breath, headache, lightheadedness and blurred vision. She is breast feeding and she denies any breast tenderness. She is ambulating well. Her voiding pattern is normal. I reviewed signs and symptoms of post partum depression with the patient, she currently denies any of these symptoms. She is tolerating solids.     Vital Signs:  Vitals:    24 0800 24 1044 24 1625 24   BP: 106/65  102/68 110/73   Pulse: 66  99 86   Resp: 18  18 16   Temp: 98.3 °F (36.8 °C)  98.4 °F (36.9 °C) 98.4 °F (36.9 °C)   TempSrc: Oral  Oral Oral   SpO2: 97%   98%   Weight:  61.7 kg (136 lb)     Height:  1.626 m (5' 4\")           Physical Exam:  General:  no apparent distress, alert and cooperative  Neurologic:  alert, oriented, normal speech, no focal findings or movement disorder noted  Lungs:  No increased work of breathing, no conversational dyspnea  Heart:  regular rate    Abdomen: abdomen soft, non-distended, non-tender  Fundus: non-tender, firm, below umbilicus  Extremities:  no calf tenderness, non edematous    Lab:  Lab Results   Component Value Date    HGB 12.0 2024     Lab Results   Component Value Date    HCT 37.3 2024       Assessment/Plan:  Elsa Marin is a  PPD # 2 s/p    - Doing well, VSS   - Male infant in General Care Nursery, circumcision desired and consented   - Encourage ambulation   - Motrin/Tylenol for pain    - Postpartum Hgb/Hct if

## 2024-04-06 NOTE — PROGRESS NOTES
CLINICAL PHARMACY NOTE: MEDS TO BEDS    Total # of Prescriptions Filled: 4   The following medications were delivered to the patient:  Ibuprofen 600  Tylenol 500  Senna  mirilax    Additional Documentation: meds delivered to the pt in room 733 at 12:09 on 04.06.24

## 2024-04-07 NOTE — FLOWSHEET NOTE
Patient discharged with all belongings. Instructions reviewed and all questions answered.Transportation provided by her sister.

## 2024-04-08 LAB — SURGICAL PATHOLOGY REPORT: NORMAL
